# Patient Record
Sex: FEMALE | Race: WHITE | NOT HISPANIC OR LATINO | Employment: OTHER | URBAN - METROPOLITAN AREA
[De-identification: names, ages, dates, MRNs, and addresses within clinical notes are randomized per-mention and may not be internally consistent; named-entity substitution may affect disease eponyms.]

---

## 2017-07-10 ENCOUNTER — TRANSFERRED RECORDS (OUTPATIENT)
Dept: HEALTH INFORMATION MANAGEMENT | Facility: CLINIC | Age: 57
End: 2017-07-10

## 2019-12-20 NOTE — PROGRESS NOTES
Subjective     Carolyn Saldivar is a 59 year old female who presents to clinic today for the following health issues:    HPI   Joint Pain    Onset: 2 months    Description:   Location: left hand 4th digit  Character: Fullness    Intensity: moderate    Progression of Symptoms: same    Accompanying Signs & Symptoms:  Other symptoms: warmth and swelling    History:   Previous similar pain: no       Precipitating factors:   Trauma or overuse: YES- water volleyball    Alleviating factors:  Improved by: immobilization    Therapies Tried and outcome: tapping it to the other finger to immobilize it little relief      -------------------------------------    There is no problem list on file for this patient.    History reviewed. No pertinent surgical history.    Social History     Tobacco Use     Smoking status: Former Smoker     Smokeless tobacco: Never Used   Substance Use Topics     Alcohol use: Yes     Comment: occasionally     Family History   Problem Relation Age of Onset     Heart Failure Mother          Current Outpatient Medications   Medication Sig Dispense Refill     FLUoxetine (PROZAC) 10 MG capsule TAKE 1 CAPSULE BY MOUTH EVERY DAY       fluticasone (FLONASE) 50 MCG/ACT nasal spray INSTILL 1 SPRAY NASALLY ONE TIME A DAY. SHAKE GENTLY BEFORE EACH USE; ALTERNATE NOSTRILS WITH EACH SPRAY.       loratadine (CLARITIN) 10 MG tablet Take 10 mg by mouth daily       losartan (COZAAR) 25 MG tablet TAKE 1 TABLET BY MOUTH EVERY DAY       Allergies   Allergen Reactions     Cats      Dogs      Seasonal Allergies Other (See Comments)     Birch trees, maple trees, aramis grass, orchard grass, mugwart, lamb's quarter, dust mites     BP Readings from Last 3 Encounters:   12/24/19 126/88    Wt Readings from Last 3 Encounters:   12/24/19 81.6 kg (180 lb)                    Reviewed and updated as needed this visit by Provider         Review of Systems   ROS COMP: Constitutional, HEENT, cardiovascular, pulmonary, GI, , musculoskeletal,  "neuro, skin, endocrine and psych systems are negative, except as otherwise noted.      Objective    /88   Pulse 70   Temp 97.6  F (36.4  C) (Temporal)   Resp 14   Ht 1.596 m (5' 2.84\")   Wt 81.6 kg (180 lb)   LMP  (LMP Unknown)   SpO2 95%   BMI 32.05 kg/m    Body mass index is 32.05 kg/m .  Physical Exam  Constitutional:       Appearance: Normal appearance.   HENT:      Head: Normocephalic and atraumatic.   Neck:      Musculoskeletal: Normal range of motion and neck supple.   Cardiovascular:      Rate and Rhythm: Normal rate and regular rhythm.      Pulses: Normal pulses.      Heart sounds: Normal heart sounds. No murmur. No friction rub. No gallop.    Pulmonary:      Effort: Pulmonary effort is normal. No respiratory distress.      Breath sounds: Normal breath sounds.   Chest:      Chest wall: No tenderness.   Musculoskeletal:      Comments: Swollen and mildly tender left ringer finger with limited ROM .    Neurological:      General: No focal deficit present.      Mental Status: She is alert and oriented to person, place, and time.            Diagnostic Test Results:  Labs reviewed in Epic        Assessment & Plan   Problem List Items Addressed This Visit     None      Visit Diagnoses     Injury of finger of left hand, initial encounter    -  Primary    Relevant Orders    XR Finger Left G/E 2 Views (Completed)    OCCUPATIONAL THERAPY REFERRAL         X-ry negative for fracture  Will refer to OT for therapy. Limited ROM likely secondary to prolonged splinting.   Discussed home care  Reportable signs and symptoms discussed  RTC if symptoms persist or fail to improve      BMI:   Estimated body mass index is 32.05 kg/m  as calculated from the following:    Height as of this encounter: 1.596 m (5' 2.84\").    Weight as of this encounter: 81.6 kg (180 lb).           See Patient Instructions  Return in about 3 months (around 3/24/2020) for Physical Exam.    Marcelina Thomas MD  [facility]    "

## 2019-12-24 ENCOUNTER — TELEPHONE (OUTPATIENT)
Dept: FAMILY MEDICINE | Facility: OTHER | Age: 59
End: 2019-12-24

## 2019-12-24 ENCOUNTER — ANCILLARY PROCEDURE (OUTPATIENT)
Dept: GENERAL RADIOLOGY | Facility: OTHER | Age: 59
End: 2019-12-24
Attending: FAMILY MEDICINE
Payer: COMMERCIAL

## 2019-12-24 ENCOUNTER — OFFICE VISIT (OUTPATIENT)
Dept: FAMILY MEDICINE | Facility: OTHER | Age: 59
End: 2019-12-24
Payer: COMMERCIAL

## 2019-12-24 VITALS
WEIGHT: 180 LBS | DIASTOLIC BLOOD PRESSURE: 88 MMHG | RESPIRATION RATE: 14 BRPM | HEIGHT: 63 IN | TEMPERATURE: 97.6 F | OXYGEN SATURATION: 95 % | HEART RATE: 70 BPM | BODY MASS INDEX: 31.89 KG/M2 | SYSTOLIC BLOOD PRESSURE: 126 MMHG

## 2019-12-24 DIAGNOSIS — S69.92XA INJURY OF FINGER OF LEFT HAND, INITIAL ENCOUNTER: ICD-10-CM

## 2019-12-24 DIAGNOSIS — S69.92XA INJURY OF FINGER OF LEFT HAND, INITIAL ENCOUNTER: Primary | ICD-10-CM

## 2019-12-24 PROCEDURE — 99203 OFFICE O/P NEW LOW 30 MIN: CPT | Performed by: FAMILY MEDICINE

## 2019-12-24 PROCEDURE — 73140 X-RAY EXAM OF FINGER(S): CPT | Mod: LT

## 2019-12-24 RX ORDER — FLUTICASONE PROPIONATE 50 MCG
SPRAY, SUSPENSION (ML) NASAL
COMMUNITY
Start: 2017-07-07

## 2019-12-24 RX ORDER — LOSARTAN POTASSIUM 25 MG/1
TABLET ORAL
COMMUNITY
Start: 2018-10-01 | End: 2020-03-31

## 2019-12-24 RX ORDER — FLUOXETINE 10 MG/1
CAPSULE ORAL
COMMUNITY
Start: 2018-07-05 | End: 2020-03-31

## 2019-12-24 RX ORDER — LORATADINE 10 MG/1
10 TABLET ORAL DAILY
COMMUNITY
End: 2020-05-12

## 2019-12-24 ASSESSMENT — MIFFLIN-ST. JEOR: SCORE: 1352.98

## 2019-12-24 NOTE — TELEPHONE ENCOUNTER
----- Message from Marcelina Thomas MD sent at 12/24/2019 10:35 AM CST -----  No apparent fracture noted on X-ray. I placed a order for occupational therapy.

## 2020-03-31 ENCOUNTER — VIRTUAL VISIT (OUTPATIENT)
Dept: FAMILY MEDICINE | Facility: OTHER | Age: 60
End: 2020-03-31
Payer: COMMERCIAL

## 2020-03-31 DIAGNOSIS — F41.1 GENERALIZED ANXIETY DISORDER: ICD-10-CM

## 2020-03-31 DIAGNOSIS — I10 BENIGN ESSENTIAL HYPERTENSION: ICD-10-CM

## 2020-03-31 DIAGNOSIS — F32.1 MODERATE MAJOR DEPRESSION (H): Primary | ICD-10-CM

## 2020-03-31 PROCEDURE — 99214 OFFICE O/P EST MOD 30 MIN: CPT | Mod: TEL | Performed by: PHYSICIAN ASSISTANT

## 2020-03-31 RX ORDER — LOSARTAN POTASSIUM 25 MG/1
25 TABLET ORAL DAILY
Qty: 90 TABLET | Refills: 1 | Status: SHIPPED | OUTPATIENT
Start: 2020-03-31 | End: 2020-08-19

## 2020-03-31 RX ORDER — FLUOXETINE 10 MG/1
CAPSULE ORAL
Qty: 90 CAPSULE | Refills: 1 | Status: SHIPPED | OUTPATIENT
Start: 2020-03-31 | End: 2020-08-19

## 2020-03-31 ASSESSMENT — ANXIETY QUESTIONNAIRES
7. FEELING AFRAID AS IF SOMETHING AWFUL MIGHT HAPPEN: SEVERAL DAYS
6. BECOMING EASILY ANNOYED OR IRRITABLE: MORE THAN HALF THE DAYS
3. WORRYING TOO MUCH ABOUT DIFFERENT THINGS: MORE THAN HALF THE DAYS
2. NOT BEING ABLE TO STOP OR CONTROL WORRYING: MORE THAN HALF THE DAYS
5. BEING SO RESTLESS THAT IT IS HARD TO SIT STILL: NEARLY EVERY DAY
IF YOU CHECKED OFF ANY PROBLEMS ON THIS QUESTIONNAIRE, HOW DIFFICULT HAVE THESE PROBLEMS MADE IT FOR YOU TO DO YOUR WORK, TAKE CARE OF THINGS AT HOME, OR GET ALONG WITH OTHER PEOPLE: SOMEWHAT DIFFICULT
1. FEELING NERVOUS, ANXIOUS, OR ON EDGE: SEVERAL DAYS
GAD7 TOTAL SCORE: 12

## 2020-03-31 ASSESSMENT — PATIENT HEALTH QUESTIONNAIRE - PHQ9
SUM OF ALL RESPONSES TO PHQ QUESTIONS 1-9: 13
5. POOR APPETITE OR OVEREATING: SEVERAL DAYS

## 2020-03-31 NOTE — PROGRESS NOTES
"Subjective     Carolyn Saldivar is a 59 year old female who is being evaluated via a billable telephone visit.      The patient has been notified of following:     \"This telephone visit will be conducted via a call between you and your physician/provider. We have found that certain health care needs can be provided without the need for a physical exam.  This service lets us provide the care you need with a short phone conversation.  If a prescription is necessary we can send it directly to your pharmacy.  If lab work is needed we can place an order for that and you can then stop by our lab to have the test done at a later time.    If during the course of the call the physician/provider feels a telephone visit is not appropriate, you will not be charged for this service.\"     Patient has given verbal consent for Telephone visit?  Yes     Carolyn Saldivar complains of   Chief Complaint   Patient presents with     Recheck Medication     fluoxitine and losartan     She was previously being seen at [facility] in [place] but moved to [place] within the past year so is transferring all of her care to [place]. She is currently down in [place] and she and her  are \"snowbirds\" down there during the winter months. She is requesting a refill of Prozac and losartan. She has a long history of anxiety and depression and states her symptoms have been fairly stable on Prozac for years. She also feels she is in a better mood when she is down in [place]. She has occasional thoughts things would be easier if she were dead but she states she would never harm herself. She is not interested in counseling at this time.     Assessment/Plan:    ICD-10-CM    1. Moderate major depression (H)  F32.1 FLUoxetine (PROZAC) 10 MG capsule   2. Generalized anxiety disorder  F41.1 FLUoxetine (PROZAC) 10 MG capsule   3. Benign essential hypertension  I10 losartan (COZAAR) 25 MG tablet       1-2. Depression and anxiety have overall been " stable. She has no serious thoughts of self harm and states she is actually doing quite well. Will continue current dose of Prozac. She is not interested in counseling at this time but if she changes her mind, she will contact the clinic.     3. Continue current dose of Losartan.    Follow up this summer for annual physical.       Phone call duration: 5 minutes

## 2020-04-01 ASSESSMENT — ANXIETY QUESTIONNAIRES: GAD7 TOTAL SCORE: 12

## 2020-05-12 ENCOUNTER — VIRTUAL VISIT (OUTPATIENT)
Dept: FAMILY MEDICINE | Facility: OTHER | Age: 60
End: 2020-05-12
Payer: COMMERCIAL

## 2020-05-12 DIAGNOSIS — G44.209 TENSION HEADACHE: Primary | ICD-10-CM

## 2020-05-12 PROCEDURE — 99214 OFFICE O/P EST MOD 30 MIN: CPT | Mod: 95 | Performed by: PHYSICIAN ASSISTANT

## 2020-05-12 RX ORDER — PREDNISONE 20 MG/1
40 TABLET ORAL DAILY
Qty: 14 TABLET | Refills: 0 | Status: SHIPPED | OUTPATIENT
Start: 2020-05-12 | End: 2020-07-07

## 2020-05-12 RX ORDER — FEXOFENADINE HCL AND PSEUDOEPHEDRINE HCL 180; 240 MG/1; MG/1
1 TABLET, EXTENDED RELEASE ORAL DAILY
COMMUNITY
End: 2020-06-20

## 2020-05-12 NOTE — PROGRESS NOTES
"Carolyn Saldivar is a 59 year old female who is being evaluated via a billable video visit.      The patient has been notified of following:     \"This video visit will be conducted via a call between you and your physician/provider. We have found that certain health care needs can be provided without the need for an in-person physical exam.  This service lets us provide the care you need with a video conversation.  If a prescription is necessary we can send it directly to your pharmacy.  If lab work is needed we can place an order for that and you can then stop by our lab to have the test done at a later time.    Video visits are billed at different rates depending on your insurance coverage.  Please reach out to your insurance provider with any questions.    If during the course of the call the physician/provider feels a video visit is not appropriate, you will not be charged for this service.\"    Patient has given verbal consent for Video visit? Yes    How would you like to obtain your AVS? E-Mail (inform patient AVS not encrypted)    Patient would like the video invitation sent by: Text to cell phone: [phone]    Will anyone else be joining your video visit? No        Subjective     Carolyn Saldivar is a 59 year old female who presents today via video visit for the following health issues:    HPI    Headache  Onset: Been going on for about 1 month    Description:   Location: \"up the back of my my head\" and tender to the touch   Character: sharp pain  Frequency:  Everyday. Will take 800mg of ibuprofen each time she gets them.   Duration:  Constant. Ibuprofen 800mg takes it away. But when that wears off her headaches come back.     Intensity: severe    Progression of Symptoms:  same    Accompanying Signs & Symptoms:  Stiff neck: YES- when she turns her neck it is sore and she can hear it crack.   Neck or upper back pain: YES- left shoulder pain that comes and goes.  Fever: no  Sinus pressure: sometimes it feels " like she has sinus pressure. She is more congested with her allergies.   Nausea or vomiting: no  Dizziness: no  Numbness: no  Weakness: no  Visual changes: no    History:   Head trauma: no  Family history of migraines: no  Previous tests for headaches: no  Neurologist evaluations: no  Able to do daily activities: YES- as long as she takes her 800mg of ibuprofen.   Wake with a headaches: YES- some days  Do headaches wake you up: no  Daily pain medication use: YES- just the ibuprofen 800mg  Work/school stressors/changes: no     Precipitating factors:   Does light make it worse: no  Does sound make it worse: no    Alleviating factors:  Does sleep help: YES    Video Start Time: 3:57 PM    Patient Active Problem List   Diagnosis     Moderate major depression (H)     Generalized anxiety disorder     Benign essential hypertension     History reviewed. No pertinent surgical history.    Social History     Tobacco Use     Smoking status: Former Smoker     Smokeless tobacco: Never Used   Substance Use Topics     Alcohol use: Yes     Comment: occasionally     Family History   Problem Relation Age of Onset     Heart Failure Mother          Current Outpatient Medications   Medication Sig Dispense Refill     fexofenadine-pseudoePHEDrine (ALLEGRA-D 24) 180-240 MG 24 hr tablet Take 1 tablet by mouth daily       FLUoxetine (PROZAC) 10 MG capsule TAKE 1 CAPSULE BY MOUTH EVERY DAY 90 capsule 1     fluticasone (FLONASE) 50 MCG/ACT nasal spray INSTILL 1 SPRAY NASALLY ONE TIME A DAY. SHAKE GENTLY BEFORE EACH USE; ALTERNATE NOSTRILS WITH EACH SPRAY.       losartan (COZAAR) 25 MG tablet Take 1 tablet (25 mg) by mouth daily 90 tablet 1     predniSONE (DELTASONE) 20 MG tablet Take 2 tablets (40 mg) by mouth daily for 7 days 14 tablet 0     Allergies   Allergen Reactions     Cats      Dogs      Seasonal Allergies Other (See Comments)     Birch trees, maple trees, aramis grass, orchard grass, mugwart, lamb's quarter, dust mites       Reviewed and  "updated as needed this visit by Provider         Review of Systems   Constitutional, HEENT, cardiovascular, pulmonary, GI, , musculoskeletal, neuro, skin, endocrine and psych systems are negative, except as otherwise noted.      Objective    There were no vitals taken for this visit.  Estimated body mass index is 32.05 kg/m  as calculated from the following:    Height as of 12/24/19: 1.596 m (5' 2.84\").    Weight as of 12/24/19: 81.6 kg (180 lb).  Physical Exam     GENERAL: Healthy, alert and no distress  EYES: Eyes grossly normal to inspection.  No discharge or erythema, or obvious scleral/conjunctival abnormalities.  RESP: No audible wheeze, cough, or visible cyanosis.  No visible retractions or increased work of breathing.    SKIN: Visible skin clear. No significant rash, abnormal pigmentation or lesions.  NEURO: Cranial nerves grossly intact.  Mentation and speech appropriate for age.  PSYCH: Mentation appears normal, affect normal/bright, judgement and insight intact, normal speech and appearance well-groomed.      Diagnostic Test Results:  Labs reviewed in Epic  none         Assessment & Plan     1. Tension headache  Symptoms do seem consistent with tension headache likely related to neck strain. Patient reports history of similar in the past prior to needing disc replacement in her cervical spine. She reports she did well tolerate steroids at that time. Will start short course of this as below. Recommended gentle stretching. Close monitoring of symptoms recommended. If not resolving may need to consider further imaging - especially given her history of neck surgery 7 years ago. Discussed red flag symptoms that should prompt immediate care in the ER.   - predniSONE (DELTASONE) 20 MG tablet; Take 2 tablets (40 mg) by mouth daily for 7 days  Dispense: 14 tablet; Refill: 0     The patient indicates understanding of these issues and agrees with the plan.    Melonie Lopez PA-C  [facility]      Video-Visit Details    Type of service:  Video Visit    Video End Time:4:06 PM    Originating Location (pt. Location): Home    Distant Location (provider location):  [facility]     Platform used for Video Visit: Doximity    No follow-ups on file.       Melonie Lopez PA-C

## 2020-06-20 ENCOUNTER — HOSPITAL ENCOUNTER (EMERGENCY)
Facility: CLINIC | Age: 60
Discharge: HOME | End: 2020-06-20
Attending: EMERGENCY MEDICINE | Admitting: EMERGENCY MEDICINE
Payer: COMMERCIAL

## 2020-06-20 ENCOUNTER — NURSE TRIAGE (OUTPATIENT)
Dept: NURSING | Facility: CLINIC | Age: 60
End: 2020-06-20

## 2020-06-20 ENCOUNTER — APPOINTMENT (OUTPATIENT)
Dept: GENERAL RADIOLOGY | Facility: CLINIC | Age: 60
End: 2020-06-20
Attending: EMERGENCY MEDICINE
Payer: COMMERCIAL

## 2020-06-20 VITALS
HEART RATE: 82 BPM | WEIGHT: 183 LBS | BODY MASS INDEX: 32.59 KG/M2 | TEMPERATURE: 98.7 F | RESPIRATION RATE: 20 BRPM | DIASTOLIC BLOOD PRESSURE: 74 MMHG | SYSTOLIC BLOOD PRESSURE: 138 MMHG | OXYGEN SATURATION: 92 %

## 2020-06-20 DIAGNOSIS — J20.9 ACUTE BRONCHITIS WITH BRONCHOSPASM: ICD-10-CM

## 2020-06-20 DIAGNOSIS — Z20.822 COVID-19 RULED OUT: ICD-10-CM

## 2020-06-20 LAB
SARS-COV-2 PCR COMMENT: NORMAL
SARS-COV-2 RNA SPEC QL NAA+PROBE: NEGATIVE
SARS-COV-2 RNA SPEC QL NAA+PROBE: NORMAL
SPECIMEN SOURCE: NORMAL
SPECIMEN SOURCE: NORMAL

## 2020-06-20 PROCEDURE — 99284 EMERGENCY DEPT VISIT MOD MDM: CPT | Mod: Z6 | Performed by: EMERGENCY MEDICINE

## 2020-06-20 PROCEDURE — 71045 X-RAY EXAM CHEST 1 VIEW: CPT | Mod: TC

## 2020-06-20 PROCEDURE — C9803 HOPD COVID-19 SPEC COLLECT: HCPCS | Performed by: EMERGENCY MEDICINE

## 2020-06-20 PROCEDURE — 99284 EMERGENCY DEPT VISIT MOD MDM: CPT | Mod: 25 | Performed by: EMERGENCY MEDICINE

## 2020-06-20 PROCEDURE — 25000132 ZZH RX MED GY IP 250 OP 250 PS 637: Performed by: EMERGENCY MEDICINE

## 2020-06-20 PROCEDURE — 94640 AIRWAY INHALATION TREATMENT: CPT | Performed by: EMERGENCY MEDICINE

## 2020-06-20 PROCEDURE — U0003 INFECTIOUS AGENT DETECTION BY NUCLEIC ACID (DNA OR RNA); SEVERE ACUTE RESPIRATORY SYNDROME CORONAVIRUS 2 (SARS-COV-2) (CORONAVIRUS DISEASE [COVID-19]), AMPLIFIED PROBE TECHNIQUE, MAKING USE OF HIGH THROUGHPUT TECHNOLOGIES AS DESCRIBED BY CMS-2020-01-R: HCPCS | Performed by: EMERGENCY MEDICINE

## 2020-06-20 RX ORDER — ALBUTEROL SULFATE 90 UG/1
4 AEROSOL, METERED RESPIRATORY (INHALATION) ONCE
Status: COMPLETED | OUTPATIENT
Start: 2020-06-20 | End: 2020-06-20

## 2020-06-20 RX ORDER — AZITHROMYCIN 250 MG/1
TABLET, FILM COATED ORAL
Qty: 6 TABLET | Refills: 0 | Status: SHIPPED | OUTPATIENT
Start: 2020-06-20 | End: 2020-07-10

## 2020-06-20 RX ORDER — PREDNISONE 20 MG/1
TABLET ORAL
Qty: 10 TABLET | Refills: 0 | Status: SHIPPED | OUTPATIENT
Start: 2020-06-20 | End: 2020-07-07

## 2020-06-20 RX ORDER — ALBUTEROL SULFATE 90 UG/1
2 AEROSOL, METERED RESPIRATORY (INHALATION) EVERY 4 HOURS PRN
Qty: 1 INHALER | Refills: 0 | Status: SHIPPED | OUTPATIENT
Start: 2020-06-20 | End: 2020-11-05

## 2020-06-20 RX ADMIN — ALBUTEROL SULFATE 4 PUFF: 90 AEROSOL, METERED RESPIRATORY (INHALATION) at 12:40

## 2020-06-20 ASSESSMENT — ENCOUNTER SYMPTOMS
GASTROINTESTINAL NEGATIVE: 1
RHINORRHEA: 1
ACTIVITY CHANGE: 1
FATIGUE: 0
APPETITE CHANGE: 0
PALPITATIONS: 0
FEVER: 0
SINUS PRESSURE: 0
SHORTNESS OF BREATH: 1
COUGH: 1
WHEEZING: 1
MUSCULOSKELETAL NEGATIVE: 1

## 2020-06-20 NOTE — ED PROVIDER NOTES
History     Chief Complaint   Patient presents with     Shortness of Breath     HPI  Carolyn Saldivar is a 59 year old female who presents with cough, congestion, shortness of breath.  Patient reports over the last 3-4 weeks loose cough.  Difficulty bringing phlegm up.  States rattles in her chest.  Has had no associated fever, chills, night sweats or hemoptysis.  20 pack years for tobacco use.  Stopped smoking over 10 years ago.  No unexplained weight loss.  Noted in her medical record that she was given prednisone in early May for similar complaints.  States in the past when she has this type of cough she usually also requires an albuterol inhaler.        Allergies:  Allergies   Allergen Reactions     Cats      Dogs      Seasonal Allergies Other (See Comments)     Birch trees, maple trees, aramis grass, orchard grass, mugwart, lamb's quarter, dust mites       Problem List:    Patient Active Problem List    Diagnosis Date Noted     Moderate major depression (H) 03/31/2020     Priority: Medium     Generalized anxiety disorder 03/31/2020     Priority: Medium     Benign essential hypertension 03/31/2020     Priority: Medium        Past Medical History:    Past Medical History:   Diagnosis Date     Anxiety      Depressive disorder      Hypertension        Past Surgical History:    History reviewed. No pertinent surgical history.    Family History:    Family History   Problem Relation Age of Onset     Heart Failure Mother        Social History:  Marital Status:   [2]  Social History     Tobacco Use     Smoking status: Former Smoker     Smokeless tobacco: Never Used   Substance Use Topics     Alcohol use: Yes     Comment: occasionally     Drug use: Never        Medications:    FLUoxetine (PROZAC) 10 MG capsule  loratadine-pseudoePHEDrine (CLARITIN-D 24-HOUR)  MG 24 hr tablet  losartan (COZAAR) 25 MG tablet  fluticasone (FLONASE) 50 MCG/ACT nasal spray          Review of Systems   Constitutional: Positive for  activity change. Negative for appetite change, fatigue and fever.   HENT: Positive for rhinorrhea. Negative for sinus pressure.    Respiratory: Positive for cough, shortness of breath and wheezing.    Cardiovascular: Negative for chest pain, palpitations and leg swelling.   Gastrointestinal: Negative.    Genitourinary: Negative.    Musculoskeletal: Negative.    All other systems reviewed and are negative.      Physical Exam   BP: (!) 153/100  Pulse: 75  Temp: 98.7  F (37.1  C)  Resp: 20  Weight: 83 kg (183 lb)  SpO2: 94 %      Physical Exam  Vitals signs and nursing note reviewed.   HENT:      Mouth/Throat:      Mouth: Mucous membranes are moist.   Eyes:      Pupils: Pupils are equal, round, and reactive to light.   Neck:      Musculoskeletal: Normal range of motion and neck supple.   Cardiovascular:      Rate and Rhythm: Regular rhythm.   Pulmonary:      Effort: Pulmonary effort is normal. No tachypnea.      Breath sounds: Wheezing and rhonchi present. No rales.   Musculoskeletal:      Right lower leg: No edema.      Left lower leg: No edema.   Skin:     General: Skin is warm.      Capillary Refill: Capillary refill takes less than 2 seconds.   Neurological:      General: No focal deficit present.      Mental Status: She is alert.   Psychiatric:         Mood and Affect: Mood normal.         ED Course        Procedures                   Results for orders placed or performed during the hospital encounter of 06/20/20 (from the past 24 hour(s))   XR Chest Port 1 View    Narrative    CHEST ONE VIEW PORTABLE  6/20/2020 12:40 PM     HISTORY: Shortness of breath.    COMPARISON: None available.      Impression    IMPRESSION: No acute airspace infiltrate.       Medications   albuterol (PROAIR HFA/PROVENTIL HFA/VENTOLIN HFA) 108 (90 Base) MCG/ACT inhaler 4 puff (4 puffs Inhalation Given 6/20/20 1240)       Assessments & Plan (with Medical Decision Making)  ED scoliotic 59-year-old female.  20-pack-year smoking history.   Stopped smoking 10 years ago.  No formal diagnosis of COPD.  Presents with 3-4-week history for loose cough with wheezing and dyspnea.  No fever.  No COVID exposure known.    Examination.  PUI patient, lungs are congested with scattered rhonchi and expiratory wheezing.  Chest x-ray clear  COVID screening pending  Plan: Discharge home.  Prednisone 40 mg daily x5 days, albuterol inhaler 2 puffs every 2 4 hours as needed, Zithromax.       I have reviewed the nursing notes.    I have reviewed the findings, diagnosis, plan and need for follow up with the patient.      New Prescriptions    No medications on file       Final diagnoses:   Acute bronchitis with bronchospasm   COVID-19 ruled out       6/20/2020   [facility] EMERGENCY DEPARTMENT     Darius Curry,   06/20/20 1146

## 2020-06-20 NOTE — DISCHARGE INSTRUCTIONS
Chest x-ray showed no pneumonia.   Results for orders placed or performed during the hospital encounter of 06/20/20   XR Chest Port 1 View     Status: None (Preliminary result)    Narrative    CHEST ONE VIEW PORTABLE  6/20/2020 12:40 PM     HISTORY: Shortness of breath.    COMPARISON: None available.      Impression    IMPRESSION: No acute airspace infiltrate.       TREATMENT:  Prednisone 40 mg daily days  Zithromax/Z-Jens take as directed  Albuterol inhaler 2 puffs every 4 hours as needed  4.  COVID screening test is pending.  He takes up to 2 days for results.

## 2020-06-20 NOTE — ED TRIAGE NOTES
"Patient has been short of breath since yesterday. She reports a cough x3 weeks that started with \"alergies\". History of inhaler use about 15 years ago.  "

## 2020-06-20 NOTE — ED AVS SNAPSHOT
[facility] Emergency Department  [address] [place] [address]  Phone:  [phone]  Fax:  [phone]                                    Carolyn Saldivar   MRN: [id number]    Department:  [facility] Emergency Department   Date of Visit:  6/20/2020           After Visit Summary Signature Page    I have received my discharge instructions, and my questions have been answered. I have discussed any challenges I see with this plan with the nurse or doctor.    ..........................................................................................................................................  Patient/Patient Representative Signature      ..........................................................................................................................................  Patient Representative Print Name and Relationship to Patient    ..................................................               ................................................  Date                                   Time    ..........................................................................................................................................  Reviewed by Signature/Title    ...................................................              ..............................................  Date                                               Time          22EPIC Rev 08/18

## 2020-06-20 NOTE — LETTER
June 21, 2020        Carolyn Saldivar  [id number]  [place] [address]    This letter provides a written record that you were tested for COVID-19 on 6/20/20.       Your result was negative. This means that we didn t find the virus that causes COVID-19 in your sample. A test may show negative when you do actually have the virus. This can happen when the virus is in the early stages of infection, before you feel illness symptoms.    If you have symptoms   Stay home and away from others (self-isolate) until you meet ALL of the guidelines below:    You ve had no fever--and no medicine that reduces fever--for 3 full days (72 hours). And      Your other symptoms have gotten better. For example, your cough or breathing has improved. And     At least 10 days have passed since your symptoms started.    During this time:    Stay home. Don t go to work, school or anywhere else.     Stay in your own room, including for meals. Use your own bathroom if you can.    Stay away from others in your home. No hugging, kissing or shaking hands. No visitors.    Clean  high touch  surfaces often (doorknobs, counters, handles, etc.). Use a household cleaning spray or wipes. You can find a full list on the EPA website at www.epa.gov/pesticide-registration/list-n-disinfectants-use-against-sars-cov-2.    Cover your mouth and nose with a mask, tissue or washcloth to avoid spreading germs.    Wash your hands and face often with soap and water.    Going back to work  Check with your employer for any guidelines to follow for going back to work.    Employers: This document serves as formal notice that your employee tested negative for COVID-19, as of the testing date shown above.

## 2020-06-20 NOTE — TELEPHONE ENCOUNTER
"\"I've had a cough for 3-4 weeks and I'm having trouble breathing\".  Caller is afebrile.  She can be heard wheezing over phone when breathing in.    COVID 19 Nurse Triage Plan/Patient Instructions    Please be aware that novel coronavirus (COVID-19) may be circulating in the community. If you develop symptoms such as fever, cough, or SOB or if you have concerns about the presence of another infection including coronavirus (COVID-19), please contact your health care provider or visit [email or web address].     Disposition/Instructions    Patient to go to ED and follow protocol based instructions.     Bring Your Own Device:  Please also bring your smart device(s) (smart phones, tablets, laptops) and their charging cables for your personal use and to communicate with your care team during your visit.    Thank you for taking steps to prevent the spread of this virus.  o Limit your contact with others.  o Wear a simple mask to cover your cough.  o Wash your hands well and often.    Resources    [facility]: About COVID-19: [email or web address]    CDC: What to Do If You're Sick: www.cdc.gov/coronavirus/2019-ncov/about/steps-when-sick.html    CDC: Ending Home Isolation: www.cdc.gov/coronavirus/2019-ncov/hcp/disposition-in-home-patients.html     CDC: Caring for Someone: www.cdc.gov/coronavirus/2019-ncov/if-you-are-sick/care-for-someone.html     [facility]: Interim Guidance for Hospital Discharge to Home: www.health.[facility].mn.[place]/diseases/coronavirus/hcp/hospdischarge.pdf    [facility] clinical trials (COVID-19 research studies): [email or web address].[facility]-clinical-trials     Below are the COVID-19 hotlines at the [facility] of Health ([facility]). Interpreters are available.   o For health questions: Call 822-320-3929 or 1-372.813.5657 (7 a.m. to 7 p.m.)  o For questions about schools and childcare: Call 273-698-9535 or 1-455.846.2880 (7 a.m. to 7 p.m.)       Reason for Disposition    Difficulty breathing  " (Exception: no change from usual, chronic shortness of breath)    Additional Information    Negative: Severe difficulty breathing (e.g., struggling for each breath, speaks in single words)    Negative: Bluish (or gray) lips or face now    Negative: [1] Rapid onset of cough AND [2] has hives    Negative: Coughing started suddenly after medicine, an allergic food or bee sting    Negative: [1] Difficulty breathing AND [2] exposure to flames, smoke, or fumes    Negative: [1] Stridor AND [2] difficulty breathing    Negative: Sounds like a life-threatening emergency to the triager    Negative: Choked on object of food that could be caught in the throat    Negative: Chest pain is main symptom    Negative: [1] Previous asthma attacks AND [2] this feels like asthma attack    Cough lasts > 3 weeks    Negative: Severe difficulty breathing (e.g., struggling for each breath, speaks in single words)    Negative: [1] Lips or face are bluish now AND [2] persists when not coughing    Negative: Sounds like a life-threatening emergency to the triager    Negative: Chest pain is main symptom    Negative: [1] Dry (non-productive) cough AND [2] < 3 weeks duration     (i.e., no sputum or minimal clear sputum)    Negative: [1] Wet (productive) cough AND [2] < 3 weeks duration     (i.e., white-yellow, yellow, green, or steven colored sputum)    Negative: [1] Previous asthma attacks AND [2] this feels like asthma attack    Negative: Chest pain  (Exception: MILD central chest pain, present only when coughing)    Protocols used: COUGH - CHRONIC-A-AH, COUGH - ACUTE NON-PRODUCTIVE-A-AH    Kathie Leigh RN  [facility]

## 2020-07-02 ENCOUNTER — TELEPHONE (OUTPATIENT)
Dept: FAMILY MEDICINE | Facility: OTHER | Age: 60
End: 2020-07-02

## 2020-07-02 NOTE — TELEPHONE ENCOUNTER
Patient scheduled with me next week for physical. Unfortunately, I am not in clinic next week for F2F. There are other providers she has seen that are in clinic next week or we can schedule with KV is F2F.       SHERRELL Yu CNP  Questions or concerns please feel free to send me a [email or web address] message or call me  Phone : [phone]

## 2020-07-07 PROBLEM — Z87.891 FORMER SMOKER: Status: ACTIVE | Noted: 2020-07-07

## 2020-07-07 NOTE — PROGRESS NOTES
SUBJECTIVE:   CC: Carolyn Saldivar is an 59 year old woman who presents for preventive health visit.     Healthy Habits:    Getting at least 3 servings of Calcium per day:  NO    Bi-annual eye exam:  NO    Dental care twice a year:  NO (once a year)    Sleep apnea or symptoms of sleep apnea:  Sleep apnea    Diet:  Regular (no restrictions)    Frequency of exercise:  2-3 days/week    Duration of exercise:  15-30 minutes    Taking medications regularly:  Yes    Barriers to taking medications:  None    Medication side effects:  Other (sweating a lot)    PHQ-2 Total Score:    Additional concerns today:  Yes (blood test for ovarian cancer, shingles vaccine, pneumonia vaccine, colonoscopy, breast exam/mammo)      Colonoscopy done on this date: 05/18/2010 (approximately), by this group: [facility], results were Colon polyps at 20 cm:  Benign hyperplastic polyps/polyp fragments. .   Mammogram done on this date: 07/2017 (approximately), by this group: [facility], results were Normal, resume annaully.   Pap smear done on this date: 12/9/2015 (approximately), by this group: [facility], results were.   HPV Specimen Source Cervix/Endocx     HPV Type 16 Negative Negative    HPV Type 18 Negative Negative    HPV Other Types Negative   Comment:  The following High Risk HPV genotypes are included in the Other category:  31, 33, 35, 39, 45, 51, 52, 56, 58, 59, 66, and 68    Note: HPV testing from [employer] Pap test preservative has been developed and  its performance characteristics determined by [facility]. This  source has not been cleared or approved by the FDA. Negative        Hepatitis C 2017- Negative.           Today's PHQ-2 Score: No flowsheet data found.    Abuse: Current or Past(Physical, Sexual or Emotional)- No  Do you feel safe in your environment? Yes    Have you ever done Advance Care Planning? (For example, a Health Directive, POLST, or a discussion with a medical provider or your loved ones  "about your wishes): No, advance care planning information given to patient to review.  {:563009}    Social History     Tobacco Use     Smoking status: Former Smoker     Smokeless tobacco: Never Used   Substance Use Topics     Alcohol use: Yes     Comment: occasionally     If you drink alcohol do you typically have >3 drinks per day or >7 drinks per week? No    No flowsheet data found.    Reviewed orders with patient.  Reviewed health maintenance and updated orders accordingly - { :630819::\"Yes\"}  {Chronicprobdata (optional):644058}    {Mammo Decision Support (Optional):297401}    Pertinent mammograms are reviewed under the imaging tab.  History of abnormal Pap smear: { :478099}     Reviewed and updated as needed this visit by clinical staff  Tobacco  Allergies  Meds         Reviewed and updated as needed this visit by Provider        {HISTORY OPTIONS (Optional):769951}    Review of Systems  {FEMALE ROS (Optional):818291}     OBJECTIVE:   LMP  (LMP Unknown)   Physical Exam  {Exam Choices (Optional):095186}    {Diagnostic Test Results (Optional):028506::\"Diagnostic Test Results:\",\"Labs reviewed in Epic\"}    ASSESSMENT/PLAN:   {Diag Picklist:315390}    COUNSELING:  {FEMALE COUNSELING MESSAGES:690559::\"Reviewed preventive health counseling, as reflected in patient instructions\"}    Estimated body mass index is 32.59 kg/m  as calculated from the following:    Height as of 12/24/19: 1.596 m (5' 2.84\").    Weight as of 6/20/20: 83 kg (183 lb).    {Weight Management Plan (ACO) Complete if BMI is abnormal-  Ages 18-64  BMI >24.9.  Age 65+ with BMI <23 or >30 (Optional):271405}     reports that she has quit smoking. She has never used smokeless tobacco.  {Tobacco Cessation -- Complete if patient is a smoker (Optional):214718}    Counseling Resources:  ATP IV Guidelines  Pooled Cohorts Equation Calculator  Breast Cancer Risk Calculator  FRAX Risk Assessment  ICSI Preventive Guidelines  Dietary Guidelines for Americans, " 2010  USDA's MyPlate  ASA Prophylaxis  Lung CA Screening    Ronda Mena, SHERRELL [facility]

## 2020-07-07 NOTE — PATIENT INSTRUCTIONS
Carolyn,     It was nice meeting you today. I apologize we were not able to have your physical.   - Recommend that we start prednisone for your cough along with antibiotics  - Continue to use albuterol as needed  - Covid-19 testing, if this is negative we can do antibody testing to see if you have had Covid-19   - Follow up with scheduled physical.       SHERRELL Yu CNP  Questions or concerns please feel free to send me a [employer] message or call me  Phone : [phone]

## 2020-07-12 ENCOUNTER — TELEPHONE (OUTPATIENT)
Dept: FAMILY MEDICINE | Facility: OTHER | Age: 60
End: 2020-07-12

## 2020-07-13 ENCOUNTER — OFFICE VISIT (OUTPATIENT)
Dept: FAMILY MEDICINE | Facility: OTHER | Age: 60
End: 2020-07-13
Payer: COMMERCIAL

## 2020-07-13 DIAGNOSIS — Z87.891 FORMER SMOKER: ICD-10-CM

## 2020-07-13 DIAGNOSIS — J20.9 ACUTE BRONCHITIS WITH SYMPTOMS > 10 DAYS: Primary | ICD-10-CM

## 2020-07-13 PROCEDURE — 99213 OFFICE O/P EST LOW 20 MIN: CPT | Mod: 95 | Performed by: NURSE PRACTITIONER

## 2020-07-13 RX ORDER — BENZONATATE 100 MG/1
100 CAPSULE ORAL 3 TIMES DAILY PRN
Qty: 30 CAPSULE | Refills: 0 | Status: SHIPPED | OUTPATIENT
Start: 2020-07-13 | End: 2020-08-10

## 2020-07-13 RX ORDER — DOXYCYCLINE 100 MG/1
100 CAPSULE ORAL 2 TIMES DAILY
Qty: 40 CAPSULE | Refills: 0 | Status: SHIPPED | OUTPATIENT
Start: 2020-07-13 | End: 2020-08-06

## 2020-07-13 RX ORDER — PREDNISONE 20 MG/1
TABLET ORAL
Qty: 20 TABLET | Refills: 0 | Status: SHIPPED | OUTPATIENT
Start: 2020-07-13 | End: 2020-08-10

## 2020-07-13 ASSESSMENT — ENCOUNTER SYMPTOMS
CARDIOVASCULAR NEGATIVE: 1
SHORTNESS OF BREATH: 1
COUGH: 1
CONSTITUTIONAL NEGATIVE: 1

## 2020-07-13 NOTE — TELEPHONE ENCOUNTER
Next 5 appointments (look out 90 days)    Jul 13, 2020  1:00 PM CDT  PHYSICAL with SH[facility]LL [facility] CNP  [facility] [address]  [facility] [address] [phone]        Called patient to triage. Seen in ED 6/20/20 for cough/SOB. Diagnosed with acute bronchitis with bronchospasm (23 days ago. Negative for covid-19 at that time. Completed an antibiotic, prednisone, and inhaler for symptoms. Symptoms improved for 4 days, then came back slowly.   Currently has a cough (dry, rarely productive of green sputum)  Exertion makes her cough and a little SOB, using inhaler PRN  Sounds like chest is congested when she speaks  Never has ran a fever with this illness, no fever today  No exposure to a positive case of covid-19 or PUI  No international travel    Explained need to discuss today's f2f visit with  and get approval if we are to keep it as f2f or change to virtual vs. Making another plan for a recheck today. She is ok with postponing any physical items she needed today and only be seen for current symptoms.    Routed to  for recs.  Yasmeen Darby, BSN, RN, PHN

## 2020-07-13 NOTE — TELEPHONE ENCOUNTER
Left message for patient to call back.  Please advise of below and change to video visit if she has that capability.

## 2020-07-13 NOTE — PROGRESS NOTES
"Carolyn Saldivar is a 59 year old female who is being evaluated via a billable video visit.      The patient has been notified of following:     \"This video visit will be conducted via a call between you and your physician/provider. We have found that certain health care needs can be provided without the need for an in-person physical exam.  This service lets us provide the care you need with a video conversation.  If a prescription is necessary we can send it directly to your pharmacy.  If lab work is needed we can place an order for that and you can then stop by our lab to have the test done at a later time.    Video visits are billed at different rates depending on your insurance coverage.  Please reach out to your insurance provider with any questions.    If during the course of the call the physician/provider feels a video visit is not appropriate, you will not be charged for this service.\"    Patient has given verbal consent for Video visit? Yes    How would you like to obtain your AVS? Mail a copy     Patient would like the video invitation sent by: Text to cell phone: [phone]     Will anyone else be joining your video visit? No    Subjective     Carolyn Saldivar is a 59 year old female who presents today via video visit for the following health issues:    HPI     New Patient/Transfer of Care  Acute Illness   Acute illness concerns: Cough   Onset: June - but after being off of antibiotics for a week, it came back    Fever: no    Chills/Sweats: no    Headache (location?): no    Sinus Pressure:YES previously - post-nasal drainage    Conjunctivitis:  no    Ear Pain: no    Rhinorrhea: no    Congestion: no    Sore Throat: no     Cough: YES-productive of brown/green sputum    Wheeze: YES- with shortness of breath    Decreased Appetite: no    Nausea: no    Vomiting: no    Diarrhea:  no    Dysuria/Freq.: no    Fatigue/Achiness: no    Sick/Strep Exposure: no     Therapies Tried and outcome: some antibiotic for 4 " days - albuterol and prednisone - which helped but after being off for a week it came back.    Able to speak full sentences   Taking Albuterol inhaler  Prednisone for 4 days and an antibiotic  Shortness of breath is when she gets up and start walking. She can still function and able to do what she normally does. Does not know of any underlying asthma or COPD. Smoked years ago.   No congestion no headaches no sinus pressure  No nausea or dizziness. Only dizzy if she coughs a ton  No fevers  Hard to tell if she is more fatigued does not feel it is anything out of the normal. Maybe a bit more with heat.   She just coughed up a bunch of phlegm before are call. Been coughing all morning.         Was seen in ER 06/20/20  Assessments & Plan (with Medical Decision Making)  ED scoliotic 59-year-old female.  20-pack-year smoking history.  Stopped smoking 10 years ago.  No formal diagnosis of COPD.  Presents with 3-4-week history for loose cough with wheezing and dyspnea.  No fever.  No COVID exposure known.    Examination.  PUI patient, lungs are congested with scattered rhonchi and expiratory wheezing.  Chest x-ray clear  COVID screening pending  Plan: Discharge home.  Prednisone 40 mg daily x5 days, albuterol inhaler 2 puffs every 2 4 hours as needed, Zithromax.    Video Start Time: 1:22 PM    Current Outpatient Medications   Medication Sig Dispense Refill     albuterol (PROAIR HFA) 108 (90 Base) MCG/ACT inhaler Inhale 2 puffs into the lungs every 4 hours as needed for shortness of breath / dyspnea 1 Inhaler 0     benzonatate (TESSALON) 100 MG capsule Take 1 capsule (100 mg) by mouth 3 times daily as needed for cough 30 capsule 0     doxycycline hyclate (VIBRAMYCIN) 100 MG capsule Take 1 capsule (100 mg) by mouth 2 times daily for 20 days 40 capsule 0     FLUoxetine (PROZAC) 10 MG capsule TAKE 1 CAPSULE BY MOUTH EVERY DAY 90 capsule 1     fluticasone (FLONASE) 50 MCG/ACT nasal spray INSTILL 1 SPRAY NASALLY ONE TIME A DAY.  SHAKE GENTLY BEFORE EACH USE; ALTERNATE NOSTRILS WITH EACH SPRAY.       loratadine-pseudoePHEDrine (CLARITIN-D 24-HOUR)  MG 24 hr tablet Take 1 tablet by mouth daily       losartan (COZAAR) 25 MG tablet Take 1 tablet (25 mg) by mouth daily 90 tablet 1     predniSONE (DELTASONE) 20 MG tablet Take 3 tabs by mouth daily x 3 days, then 2 tabs daily x 3 days, then 1 tab daily x 3 days, then 1/2 tab daily x 3 days. 20 tablet 0     BP Readings from Last 3 Encounters:   06/20/20 138/74   12/24/19 126/88    Wt Readings from Last 3 Encounters:   06/20/20 83 kg (183 lb)   12/24/19 81.6 kg (180 lb)                    Reviewed and updated as needed this visit by Provider  Allergies         Review of Systems   Constitutional: Negative.    HENT: Negative.    Respiratory: Positive for cough and shortness of breath.    Cardiovascular: Negative.             Objective             Physical Exam     GENERAL: Healthy, alert and no distress  EYES: Eyes grossly normal to inspection.  No discharge or erythema, or obvious scleral/conjunctival abnormalities.  RESP: no wheezes and barking cough present. Able to speak full sentences.   SKIN: Visible skin clear. No significant rash, abnormal pigmentation or lesions.  NEURO: Cranial nerves grossly intact.  Mentation and speech appropriate for age.  PSYCH: Mentation appears normal, affect normal/bright, judgement and insight intact, normal speech and appearance well-groomed.      Diagnostic Test Results:  Covid -19 ordered         Assessment & Plan       ICD-10-CM    1. Acute bronchitis with symptoms > 10 days  J20.9 predniSONE (DELTASONE) 20 MG tablet     doxycycline hyclate (VIBRAMYCIN) 100 MG capsule     benzonatate (TESSALON) 100 MG capsule     Symptomatic COVID-19 Virus (Coronavirus) by PCR   2. Former smoker  Z87.891      Given patients history suspect that this is likely reactive airway. Given length of time she has had symptoms I do recommend that we do a longer course of steroids  along with doxycycline. No fevers or chills and she is able to speak full sentences so I do not feel that we need to do xrays or physical assessment at this time. She appears stable. Given previously negative covid-19, if this is negative recommend serology testing. Tessalon pearls to help with cough. Recommend self-isolate until Covid-19 is back. Follow up to do physical 4 weeks, may want to consider pulmonology lung function testing given former smoker.       The patient indicates understanding of these issues and agrees with the plan.    See Patient Instructions  Patient Instructions   Carolyn,     It was nice meeting you today.   - Schedule Mammogram when able, this can be done by calling [phone].   - You will be getting a number to schedule your colonoscopy.   - I will contact you with your results.   - Continue current medications  - Follow up as needed.       SHERRELL Yu CNP  Questions or concerns please feel free to send me a [employer] message or call me  Phone : [phone]                Return in about 4 weeks (around 8/10/2020) for Physical Exam.    SHERRELL Yu CNP  [facility]      Video-Visit Details    Type of service:  Video Visit    Video End Time:1:38 PM    Originating Location (pt. Location): Home    Distant Location (provider location):  [facility]     Platform used for Video Visit: Nirav    Return in about 4 weeks (around 8/10/2020) for Physical Exam.       SHERRELL Yu CNP

## 2020-07-13 NOTE — TELEPHONE ENCOUNTER
RN's please call patient in the AM. Triage SOB and Cough. Can I do virtual with PUI assessment or does she need higher level of care. Huddle with KV after triage.     Please reschedule physical for later date.     SHERRELL Yu CNP  Questions or concerns please feel free to send me a [employer] message or call me  Phone : [phone]

## 2020-07-13 NOTE — TELEPHONE ENCOUNTER
Please schedule video visit. I will possibly have her come in for PUI xray and labs in addition to some vitals.       SHERRELL Yu CNP  Questions or concerns please feel free to send me a [employer] message or call me  Phone : [phone]

## 2020-07-13 NOTE — TELEPHONE ENCOUNTER
Please mail AVS from todays virtual visit. Give patient information to start [employer] as well.       SHERRELL Yu CNP  Questions or concerns please feel free to send me a [employer] message or call me  Phone : [phone]

## 2020-07-28 ENCOUNTER — TELEPHONE (OUTPATIENT)
Dept: FAMILY MEDICINE | Facility: OTHER | Age: 60
End: 2020-07-28

## 2020-07-28 DIAGNOSIS — Z12.31 ENCOUNTER FOR SCREENING MAMMOGRAM FOR BREAST CANCER: ICD-10-CM

## 2020-07-28 DIAGNOSIS — G47.33 OSA ON CPAP: ICD-10-CM

## 2020-07-28 DIAGNOSIS — E66.811 OBESITY (BMI 30.0-34.9): ICD-10-CM

## 2020-07-28 DIAGNOSIS — Z12.11 SPECIAL SCREENING FOR MALIGNANT NEOPLASMS, COLON: ICD-10-CM

## 2020-07-28 DIAGNOSIS — F41.1 GENERALIZED ANXIETY DISORDER: ICD-10-CM

## 2020-07-28 DIAGNOSIS — Z87.891 FORMER SMOKER: ICD-10-CM

## 2020-07-28 DIAGNOSIS — F32.1 MODERATE MAJOR DEPRESSION (H): ICD-10-CM

## 2020-07-28 DIAGNOSIS — I10 BENIGN ESSENTIAL HYPERTENSION: Primary | ICD-10-CM

## 2020-07-28 DIAGNOSIS — E78.1 HYPERTRIGLYCERIDEMIA: ICD-10-CM

## 2020-07-29 NOTE — PROGRESS NOTES
"   SUBJECTIVE:   CC: Carolyn Saldivar is an 59 year old woman who presents for preventive health visit.     Healthy Habits:     Getting at least 3 servings of Calcium per day:  NO    Bi-annual eye exam:  NO    Dental care twice a year:  NO    Sleep apnea or symptoms of sleep apnea:  Sleep apnea    Diet:  Regular (no restrictions)    Frequency of exercise:  2-3 days/week    Duration of exercise:  30-45 minutes    Taking medications regularly:  Yes    Barriers to taking medications:  None    Medication side effects:  Other (1) \"i sweats \" pt is unsure if this side effects)    PHQ-2 Total Score: 1    Additional concerns today:  No      Colonoscopy done on this date: 05/18/10 (approximately), by this group:[facility] , results were Benign hyperplastic polyps/polyp fragments   Mammogram done on this date: 07/12/17 (approximately), by this group: [facility], results were Normal.   Pap smear done on this date:12/09/15  (approximately), by this group: [facility] , results were Normal, Negative HPV.   Hep C negative.      is alcoholic  Son addicted to Meth  So lots of situational stress.  Prozac daily, been on that for years.     Novashare ablation. Menopaus 5-6 years ago.         Today's PHQ-2 Score:   PHQ-2 ( 1999 Pfizer) 7/10/2020   Q1: Little interest or pleasure in doing things 0   Q2: Feeling down, depressed or hopeless 0   PHQ-2 Score 0     PHQ 3/31/2020 8/6/2020   PHQ-9 Total Score 13 8   Q9: Thoughts of better off dead/self-harm past 2 weeks Several days Several days     BRENDA-7 SCORE 3/31/2020 8/6/2020   Total Score 12 10           Abuse: Current or Past(Physical, Sexual or Emotional)- No  Do you feel safe in your environment? Yes    Have you ever done Advance Care Planning? (For example, a Health Directive, POLST, or a discussion with a medical provider or your loved ones about your wishes): No, advance care planning information given to patient to review.  Advanced care planning was discussed at " today's visit.    Social History     Tobacco Use     Smoking status: Former Smoker     Packs/day: 0.50     Years: 15.00     Pack years: 7.50     Types: Cigarettes     Start date:      Last attempt to quit:      Years since quittin.6     Smokeless tobacco: Never Used   Substance Use Topics     Alcohol use: Yes     Comment: occasionally     If you drink alcohol do you typically have >3 drinks per day or >7 drinks per week? No    Alcohol Use 8/10/2020   Prescreen: >3 drinks/day or >7 drinks/week? No       Reviewed orders with patient.  Reviewed health maintenance and updated orders accordingly - Yes  Lab work is in process  BP Readings from Last 3 Encounters:   08/10/20 122/74   20 138/74   19 126/88    Wt Readings from Last 3 Encounters:   08/10/20 81.9 kg (180 lb 9.6 oz)   20 83 kg (183 lb)   19 81.6 kg (180 lb)                  Patient Active Problem List   Diagnosis     Moderate major depression (H)     Generalized anxiety disorder     Benign essential hypertension     Allergic rhinitis due to pollen     Carpal tunnel syndrome     Diverticulosis of colon     Eczema     Family history of colonic polyps     Heartburn     Hypertriglyceridemia     Obesity (BMI 30.0-34.9)     ROSA on CPAP     Routine general medical examination at a health care facility     Situational depression     Status post shoulder surgery     Former smoker     Past Surgical History:   Procedure Laterality Date     C CERV SPINE FUSN,ANTER,BELOW C2  2008    [facility]     COLONOSCOPY  2010       Social History     Tobacco Use     Smoking status: Former Smoker     Packs/day: 0.50     Years: 15.00     Pack years: 7.50     Types: Cigarettes     Start date:      Last attempt to quit:      Years since quittin.6     Smokeless tobacco: Never Used   Substance Use Topics     Alcohol use: Yes     Comment: occasionally     Family History   Problem Relation Age of Onset     Heart Failure Mother   "        Current Outpatient Medications   Medication Sig Dispense Refill     albuterol (PROAIR HFA) 108 (90 Base) MCG/ACT inhaler Inhale 2 puffs into the lungs every 4 hours as needed for shortness of breath / dyspnea 1 Inhaler 0     FLUoxetine (PROZAC) 10 MG capsule TAKE 1 CAPSULE BY MOUTH EVERY DAY 90 capsule 1     fluticasone (FLONASE) 50 MCG/ACT nasal spray INSTILL 1 SPRAY NASALLY ONE TIME A DAY. SHAKE GENTLY BEFORE EACH USE; ALTERNATE NOSTRILS WITH EACH SPRAY.       loratadine-pseudoePHEDrine (CLARITIN-D 24-HOUR)  MG 24 hr tablet Take 1 tablet by mouth daily       losartan (COZAAR) 25 MG tablet Take 1 tablet (25 mg) by mouth daily 90 tablet 1       Mammogram Screening: Patient over age 50, mutual decision to screen reflected in health maintenance.    Pertinent mammograms are reviewed under the imaging tab.  History of abnormal Pap smear: NO - age 30-65 PAP every 5 years with negative HPV co-testing recommended     Reviewed and updated as needed this visit by clinical staff  Tobacco  Allergies  Meds  Med Hx  Surg Hx  Fam Hx  Soc Hx        Reviewed and updated as needed this visit by Provider  Meds        Past Medical History:   Diagnosis Date     Anxiety      Depressive disorder      Hypertension       Past Surgical History:   Procedure Laterality Date     C CERV SPINE FUSN,ANTER,BELOW C2  06/08/2008    [facility]     COLONOSCOPY  05/18/2010       Review of Systems       OBJECTIVE:   /74   Pulse 72   Temp 98.1  F (36.7  C)   Resp 14   Ht 1.6 m (5' 3\")   Wt 81.9 kg (180 lb 9.6 oz)   LMP  (LMP Unknown)   BMI 31.99 kg/m    Physical Exam  Constitutional:       Appearance: She is well-developed.   HENT:      Right Ear: Tympanic membrane, ear canal and external ear normal.      Left Ear: Tympanic membrane, ear canal and external ear normal.      Nose: Nose normal.      Mouth/Throat:      Mouth: Mucous membranes are dry.   Eyes:      Conjunctiva/sclera: Conjunctivae normal.      Pupils: " Pupils are equal, round, and reactive to light.      Comments: Bilateral eyelids with skin tags.      Neck:      Musculoskeletal: Normal range of motion and neck supple.      Thyroid: No thyroid mass, thyromegaly or thyroid tenderness.   Cardiovascular:      Rate and Rhythm: Normal rate and regular rhythm.      Heart sounds: Normal heart sounds.   Pulmonary:      Effort: Pulmonary effort is normal.      Breath sounds: Normal breath sounds.   Chest:      Chest wall: No mass.      Breasts:         Right: No swelling, bleeding, inverted nipple, mass, nipple discharge, skin change or tenderness.         Left: Normal. No swelling, bleeding, inverted nipple, mass, nipple discharge, skin change or tenderness.   Abdominal:      General: Bowel sounds are normal.      Palpations: Abdomen is soft.   Genitourinary:     Cervix: No cervical motion tenderness, discharge, friability, erythema or cervical bleeding.   Musculoskeletal: Normal range of motion.   Lymphadenopathy:      Cervical: No cervical adenopathy.      Right cervical: No superficial, deep or posterior cervical adenopathy.     Left cervical: No superficial, deep or posterior cervical adenopathy.      Upper Body:      Right upper body: No supraclavicular, axillary or pectoral adenopathy.      Left upper body: No supraclavicular, axillary or pectoral adenopathy.   Skin:     General: Skin is warm and dry.   Neurological:      Mental Status: She is alert and oriented to person, place, and time.           Diagnostic Test Results:  Labs reviewed in Epic    ASSESSMENT/PLAN:   1. Routine general medical examination at a health care facility  - Updated HM as this is a new patient to me today.       2. Moderate major depression (H)  - Stable on Prozac  - Having some increase in her symptoms denies any self harm. Stress with family.   - Labs today, ok to refill as needed.   - Follow up with me in 6 months.   - Comprehensive metabolic panel (BMP + Alb, Alk Phos, ALT, AST, Total.  "Bili, TP)    3. Breast cancer screening  - MA scheduled.     4. Screen for colon cancer  - Colonoscopy Scheduled     5. Screening for HIV (human immunodeficiency virus)    - HIV Screening; Future    6. Screening for malignant neoplasm of cervix  - Had questions about  currently not recommended for screening.   - Pap without pain or discomfort  - Pap imaged thin layer screen with HPV - recommended age 30 - 65 years (select HPV order below)  - HPV High Risk Types DNA Cervical    7. Benign essential hypertension  - Stable on Losartan  - Ok for refills as needed, follow up in 6 months.   - Comprehensive metabolic panel (BMP + Alb, Alk Phos, ALT, AST, Total. Bili, TP)    8. Generalized anxiety disorder  - As noted above  - Comprehensive metabolic panel (BMP + Alb, Alk Phos, ALT, AST, Total. Bili, TP)    9. Hypertriglyceridemia  - Fasting today.   - Lipid panel reflex to direct LDL Fasting    10. Obesity (BMI 30.0-34.9)      11. ROSA on CPAP  - Uses CPAP    12. Former smoker      13. Skin lesion  - Referral placed   - DERMATOLOGY REFERRAL    14. Sweating increase  - Increase in sweating with heat  - Family history of thyroid disorder- mother and sister.   - TSH with free T4 reflex  - Estradiol  - Follicle stimulating hormone    COUNSELING:  Reviewed preventive health counseling, as reflected in patient instructions       Regular exercise       Healthy diet/nutrition       Immunizations  Follow up on Shingles vaccine will let me know if she wants it here or pharmacy.          Osteoporosis Prevention/Bone Health       Colon cancer screening    Estimated body mass index is 31.99 kg/m  as calculated from the following:    Height as of this encounter: 1.6 m (5' 3\").    Weight as of this encounter: 81.9 kg (180 lb 9.6 oz).    Weight management plan: Discussed healthy diet and exercise guidelines     reports that she quit smoking about 30 years ago. Her smoking use included cigarettes. She started smoking about 50 years " ago. She has a 7.50 pack-year smoking history. She has never used smokeless tobacco.      Counseling Resources:  ATP IV Guidelines  Pooled Cohorts Equation Calculator  Breast Cancer Risk Calculator  FRAX Risk Assessment  ICSI Preventive Guidelines  Dietary Guidelines for Americans, 2010  USDA's MyPlate  ASA Prophylaxis  Lung CA Screening    SHERRELL Yu [facility]

## 2020-07-29 NOTE — TELEPHONE ENCOUNTER
Patient has physical with me, please remind to fast for labs or come in an alternative day to have this done. Is see she has an AM appointment, recommend fasting at least 8 hours prior to labs. Labs placed. Hope she is feeling better with her cough. I do not see she had the covid-19 testing, I assume she is doing better.     I also know we talked about her colonoscopy and mammogram being due. I put in orders for this for her as well. We can discuss this more at her visit if she has questions.     SHERRELL Yu CNP  Questions or concerns please feel free to send me a [facility] [employer] message or call me  Phone : [phone]

## 2020-08-04 NOTE — PATIENT INSTRUCTIONS
Carolyn,     Maureen to see you today. Recommend continuing with your preventative care as we have discussed. In addition I do recommend you consider a Shingles Vaccines.   Check at the pharmacy for coverage of the new shingles vaccine, Shingrix. If it is not covered now, it may be covered later in the year. Shingrix is given in a 2 shot series, between 2 and 6 months apart. It is recommended for adults age 50 and older. Initial studies have indicated that this new vaccine is 85-90% effective at preventing shingles, and is preferred over the old Zostavax vaccine.         Preventive Health Recommendations  Female Ages 50 - 64    Yearly exam: See your health care provider every year in order to  o Review health changes.   o Discuss preventive care.    o Review your medicines if your doctor has prescribed any.      Get a Pap test every three years (unless you have an abnormal result and your provider advises testing more often).    If you get Pap tests with HPV test, you only need to test every 5 years, unless you have an abnormal result.     You do not need a Pap test if your uterus was removed (hysterectomy) and you have not had cancer.    You should be tested each year for STDs (sexually transmitted diseases) if you're at risk.     Have a mammogram every 1 to 2 years.    Have a colonoscopy at age 50, or have a yearly FIT test (stool test). These exams screen for colon cancer.      Have a cholesterol test every 5 years, or more often if advised.    Have a diabetes test (fasting glucose) every three years. If you are at risk for diabetes, you should have this test more often.     If you are at risk for osteoporosis (brittle bone disease), think about having a bone density scan (DEXA).    Shots: Get a flu shot each year. Get a tetanus shot every 10 years.    Nutrition:     Eat at least 5 servings of fruits and vegetables each day.    Eat whole-grain bread, whole-wheat pasta and brown rice instead of white grains and  rice.    Get adequate Calcium and Vitamin D.     Lifestyle    Exercise at least 150 minutes a week (30 minutes a day, 5 days a week). This will help you control your weight and prevent disease.    Limit alcohol to one drink per day.    No smoking.     Wear sunscreen to prevent skin cancer.     See your dentist every six months for an exam and cleaning.    See your eye doctor every 1 to 2 years.

## 2020-08-05 ENCOUNTER — HOSPITAL ENCOUNTER (OUTPATIENT)
Facility: CLINIC | Age: 60
End: 2020-08-05
Attending: SURGERY | Admitting: SURGERY
Payer: COMMERCIAL

## 2020-08-05 ENCOUNTER — TELEPHONE (OUTPATIENT)
Dept: FAMILY MEDICINE | Facility: OTHER | Age: 60
End: 2020-08-05

## 2020-08-05 DIAGNOSIS — Z11.59 ENCOUNTER FOR SCREENING FOR OTHER VIRAL DISEASES: Primary | ICD-10-CM

## 2020-08-05 NOTE — LETTER

## 2020-08-05 NOTE — TELEPHONE ENCOUNTER
Left message for patient to return call to schedule EGD/colonoscopy. If Karla or Ronda are not available, please transfer to same day surgery

## 2020-08-05 NOTE — TELEPHONE ENCOUNTER
Date of colonoscopy/EGD: 9/10/20  Surgeon: Dr. Green  Prep:Miralax  Packet:Colonoscopy/EGD instructions mailed to patient's home address.   Date: 8/5/2020      Surgery Scheduler

## 2020-08-05 NOTE — LETTER
[facility]  [address]  [place] [address]  [phone]        August 5, 2020    Carolyn Saldivar  [address] [place] [address]   [facility] [address]

## 2020-08-06 ASSESSMENT — ANXIETY QUESTIONNAIRES
1. FEELING NERVOUS, ANXIOUS, OR ON EDGE: SEVERAL DAYS
IF YOU CHECKED OFF ANY PROBLEMS ON THIS QUESTIONNAIRE, HOW DIFFICULT HAVE THESE PROBLEMS MADE IT FOR YOU TO DO YOUR WORK, TAKE CARE OF THINGS AT HOME, OR GET ALONG WITH OTHER PEOPLE: SOMEWHAT DIFFICULT
7. FEELING AFRAID AS IF SOMETHING AWFUL MIGHT HAPPEN: NOT AT ALL
5. BEING SO RESTLESS THAT IT IS HARD TO SIT STILL: NEARLY EVERY DAY
GAD7 TOTAL SCORE: 10
2. NOT BEING ABLE TO STOP OR CONTROL WORRYING: SEVERAL DAYS
6. BECOMING EASILY ANNOYED OR IRRITABLE: MORE THAN HALF THE DAYS
3. WORRYING TOO MUCH ABOUT DIFFERENT THINGS: SEVERAL DAYS

## 2020-08-06 ASSESSMENT — PATIENT HEALTH QUESTIONNAIRE - PHQ9: 5. POOR APPETITE OR OVEREATING: MORE THAN HALF THE DAYS

## 2020-08-07 ASSESSMENT — ANXIETY QUESTIONNAIRES: GAD7 TOTAL SCORE: 10

## 2020-08-10 ENCOUNTER — OFFICE VISIT (OUTPATIENT)
Dept: FAMILY MEDICINE | Facility: OTHER | Age: 60
End: 2020-08-10
Payer: COMMERCIAL

## 2020-08-10 VITALS
DIASTOLIC BLOOD PRESSURE: 74 MMHG | SYSTOLIC BLOOD PRESSURE: 122 MMHG | HEIGHT: 63 IN | TEMPERATURE: 98.1 F | RESPIRATION RATE: 14 BRPM | BODY MASS INDEX: 32 KG/M2 | WEIGHT: 180.6 LBS | HEART RATE: 72 BPM

## 2020-08-10 DIAGNOSIS — F32.1 MODERATE MAJOR DEPRESSION (H): ICD-10-CM

## 2020-08-10 DIAGNOSIS — Z87.891 FORMER SMOKER: ICD-10-CM

## 2020-08-10 DIAGNOSIS — I10 BENIGN ESSENTIAL HYPERTENSION: ICD-10-CM

## 2020-08-10 DIAGNOSIS — E66.811 OBESITY (BMI 30.0-34.9): ICD-10-CM

## 2020-08-10 DIAGNOSIS — R61 SWEATING INCREASE: ICD-10-CM

## 2020-08-10 DIAGNOSIS — F41.1 GENERALIZED ANXIETY DISORDER: ICD-10-CM

## 2020-08-10 DIAGNOSIS — Z12.11 SCREEN FOR COLON CANCER: ICD-10-CM

## 2020-08-10 DIAGNOSIS — Z11.4 SCREENING FOR HIV (HUMAN IMMUNODEFICIENCY VIRUS): ICD-10-CM

## 2020-08-10 DIAGNOSIS — L98.9 SKIN LESION: ICD-10-CM

## 2020-08-10 DIAGNOSIS — Z00.00 ROUTINE GENERAL MEDICAL EXAMINATION AT A HEALTH CARE FACILITY: Primary | ICD-10-CM

## 2020-08-10 DIAGNOSIS — Z12.4 SCREENING FOR MALIGNANT NEOPLASM OF CERVIX: ICD-10-CM

## 2020-08-10 DIAGNOSIS — G47.33 OSA ON CPAP: ICD-10-CM

## 2020-08-10 DIAGNOSIS — E78.1 HYPERTRIGLYCERIDEMIA: ICD-10-CM

## 2020-08-10 DIAGNOSIS — Z12.39 BREAST CANCER SCREENING: ICD-10-CM

## 2020-08-10 LAB
ALBUMIN SERPL-MCNC: 3.8 G/DL (ref 3.4–5)
ALP SERPL-CCNC: 72 U/L (ref 40–150)
ALT SERPL W P-5'-P-CCNC: 22 U/L (ref 0–50)
ANION GAP SERPL CALCULATED.3IONS-SCNC: 6 MMOL/L (ref 3–14)
AST SERPL W P-5'-P-CCNC: 14 U/L (ref 0–45)
BILIRUB SERPL-MCNC: 0.3 MG/DL (ref 0.2–1.3)
BUN SERPL-MCNC: 12 MG/DL (ref 7–30)
CALCIUM SERPL-MCNC: 9.2 MG/DL (ref 8.5–10.1)
CHLORIDE SERPL-SCNC: 106 MMOL/L (ref 94–109)
CHOLEST SERPL-MCNC: 247 MG/DL
CO2 SERPL-SCNC: 28 MMOL/L (ref 20–32)
CREAT SERPL-MCNC: 0.75 MG/DL (ref 0.52–1.04)
ESTRADIOL SERPL-MCNC: 20 PG/ML
FSH SERPL-ACNC: 43.1 IU/L
GFR SERPL CREATININE-BSD FRML MDRD: 85 ML/MIN/{1.73_M2}
GLUCOSE SERPL-MCNC: 95 MG/DL (ref 70–99)
HDLC SERPL-MCNC: 42 MG/DL
LDLC SERPL CALC-MCNC: ABNORMAL MG/DL
LDLC SERPL DIRECT ASSAY-MCNC: 147 MG/DL
NONHDLC SERPL-MCNC: 205 MG/DL
POTASSIUM SERPL-SCNC: 4.7 MMOL/L (ref 3.4–5.3)
PROT SERPL-MCNC: 7.7 G/DL (ref 6.8–8.8)
SODIUM SERPL-SCNC: 140 MMOL/L (ref 133–144)
TRIGL SERPL-MCNC: 509 MG/DL
TSH SERPL DL<=0.005 MIU/L-ACNC: 2.51 MU/L (ref 0.4–4)

## 2020-08-10 PROCEDURE — 82670 ASSAY OF TOTAL ESTRADIOL: CPT | Performed by: NURSE PRACTITIONER

## 2020-08-10 PROCEDURE — 80053 COMPREHEN METABOLIC PANEL: CPT | Performed by: NURSE PRACTITIONER

## 2020-08-10 PROCEDURE — 80061 LIPID PANEL: CPT | Performed by: NURSE PRACTITIONER

## 2020-08-10 PROCEDURE — 84443 ASSAY THYROID STIM HORMONE: CPT | Performed by: NURSE PRACTITIONER

## 2020-08-10 PROCEDURE — 87624 HPV HI-RISK TYP POOLED RSLT: CPT | Performed by: NURSE PRACTITIONER

## 2020-08-10 PROCEDURE — 99214 OFFICE O/P EST MOD 30 MIN: CPT | Mod: 25 | Performed by: NURSE PRACTITIONER

## 2020-08-10 PROCEDURE — 83001 ASSAY OF GONADOTROPIN (FSH): CPT | Performed by: NURSE PRACTITIONER

## 2020-08-10 PROCEDURE — G0145 SCR C/V CYTO,THINLAYER,RESCR: HCPCS | Performed by: NURSE PRACTITIONER

## 2020-08-10 PROCEDURE — 36415 COLL VENOUS BLD VENIPUNCTURE: CPT | Performed by: NURSE PRACTITIONER

## 2020-08-10 PROCEDURE — 83721 ASSAY OF BLOOD LIPOPROTEIN: CPT | Mod: 59 | Performed by: NURSE PRACTITIONER

## 2020-08-10 PROCEDURE — 99396 PREV VISIT EST AGE 40-64: CPT | Performed by: NURSE PRACTITIONER

## 2020-08-10 ASSESSMENT — PAIN SCALES - GENERAL: PAINLEVEL: NO PAIN (0)

## 2020-08-10 ASSESSMENT — MIFFLIN-ST. JEOR: SCORE: 1358.33

## 2020-08-10 ASSESSMENT — ANXIETY QUESTIONNAIRES
2. NOT BEING ABLE TO STOP OR CONTROL WORRYING: NEARLY EVERY DAY
6. BECOMING EASILY ANNOYED OR IRRITABLE: SEVERAL DAYS
GAD7 TOTAL SCORE: 10
5. BEING SO RESTLESS THAT IT IS HARD TO SIT STILL: NEARLY EVERY DAY
3. WORRYING TOO MUCH ABOUT DIFFERENT THINGS: MORE THAN HALF THE DAYS
7. FEELING AFRAID AS IF SOMETHING AWFUL MIGHT HAPPEN: NOT AT ALL
IF YOU CHECKED OFF ANY PROBLEMS ON THIS QUESTIONNAIRE, HOW DIFFICULT HAVE THESE PROBLEMS MADE IT FOR YOU TO DO YOUR WORK, TAKE CARE OF THINGS AT HOME, OR GET ALONG WITH OTHER PEOPLE: SOMEWHAT DIFFICULT
1. FEELING NERVOUS, ANXIOUS, OR ON EDGE: SEVERAL DAYS

## 2020-08-10 ASSESSMENT — PATIENT HEALTH QUESTIONNAIRE - PHQ9
SUM OF ALL RESPONSES TO PHQ QUESTIONS 1-9: 7
5. POOR APPETITE OR OVEREATING: NOT AT ALL

## 2020-08-10 NOTE — LETTER
August 14, 2020    Carolyn Saldivar  [address]   [facility] [address]    Dear ,  This letter is regarding your recent Pap smear (cervical cancer screening) and Human Papillomavirus (HPV) test.  We are happy to inform you that your Pap smear result is normal. Cervical cancer is closely linked with certain types of HPV. Your results showed no evidence of high-risk HPV.  We recommend you have your next PAP smear and HPV test in 5 years.  You will still need to return to the clinic every year for an annual exam and other preventive tests.  If you have additional questions regarding this result, please call our registered nurse, Krysten at [phone].  Sincerely,    SHERRELL Yu CNP //[facility]

## 2020-08-12 LAB
COPATH REPORT: NORMAL
PAP: NORMAL

## 2020-08-13 LAB
FINAL DIAGNOSIS: NORMAL
HPV HR 12 DNA CVX QL NAA+PROBE: NEGATIVE
HPV16 DNA SPEC QL NAA+PROBE: NEGATIVE
HPV18 DNA SPEC QL NAA+PROBE: NEGATIVE
SPECIMEN DESCRIPTION: NORMAL
SPECIMEN SOURCE CVX/VAG CYTO: NORMAL

## 2020-08-14 NOTE — PROGRESS NOTES
"Subjective     Carolyn Saldivar is a 59 year old female who presents to clinic today for the following health issues:    HPI     LAB RESULTS  would like to go over results and plan of care           {SUPERLIST (Optional):671050}  {additonal problems for provider to add (Optional):283403}    {HIST REVIEW/ LINKS 2 (Optional):085420}    Reviewed and updated as needed this visit by Provider         Review of Systems   {ROS COMP (Optional):509895}      Objective    LMP  (LMP Unknown)   There is no height or weight on file to calculate BMI.  Physical Exam   {Exam List (Optional):802757}    {Diagnostic Test Results (Optional):672640::\"Diagnostic Test Results:\",\"Labs reviewed in Epic\"}        {PROVIDER CHARTING PREFERENCE:866420}  "

## 2020-08-19 ENCOUNTER — TELEPHONE (OUTPATIENT)
Dept: FAMILY MEDICINE | Facility: OTHER | Age: 60
End: 2020-08-19

## 2020-08-19 ENCOUNTER — VIRTUAL VISIT (OUTPATIENT)
Dept: FAMILY MEDICINE | Facility: OTHER | Age: 60
End: 2020-08-19
Payer: COMMERCIAL

## 2020-08-19 DIAGNOSIS — E66.811 OBESITY (BMI 30.0-34.9): ICD-10-CM

## 2020-08-19 DIAGNOSIS — I10 BENIGN ESSENTIAL HYPERTENSION: Primary | ICD-10-CM

## 2020-08-19 DIAGNOSIS — F41.1 GENERALIZED ANXIETY DISORDER: ICD-10-CM

## 2020-08-19 DIAGNOSIS — F32.1 MODERATE MAJOR DEPRESSION (H): ICD-10-CM

## 2020-08-19 DIAGNOSIS — E78.5 HYPERLIPIDEMIA LDL GOAL <130: ICD-10-CM

## 2020-08-19 DIAGNOSIS — E78.1 HYPERTRIGLYCERIDEMIA: ICD-10-CM

## 2020-08-19 DIAGNOSIS — R73.01 IMPAIRED FASTING GLUCOSE: ICD-10-CM

## 2020-08-19 PROCEDURE — 99214 OFFICE O/P EST MOD 30 MIN: CPT | Mod: 95 | Performed by: NURSE PRACTITIONER

## 2020-08-19 RX ORDER — ATORVASTATIN CALCIUM 10 MG/1
10 TABLET, FILM COATED ORAL DAILY
Qty: 90 TABLET | Refills: 0 | Status: SHIPPED | OUTPATIENT
Start: 2020-08-19 | End: 2020-11-19

## 2020-08-19 RX ORDER — OMEGA-3 FATTY ACIDS CAP DELAYED RELEASE 1000 MG 1000 MG
4 CAPSULE DELAYED RELEASE ORAL DAILY
Qty: 360 CAPSULE | Refills: 3 | Status: SHIPPED | OUTPATIENT
Start: 2020-08-19 | End: 2021-08-23

## 2020-08-19 RX ORDER — LOSARTAN POTASSIUM 25 MG/1
25 TABLET ORAL DAILY
Qty: 90 TABLET | Refills: 1 | Status: SHIPPED | OUTPATIENT
Start: 2020-08-19 | End: 2021-02-25

## 2020-08-19 RX ORDER — FLUOXETINE 10 MG/1
CAPSULE ORAL
Qty: 90 CAPSULE | Refills: 1 | Status: SHIPPED | OUTPATIENT
Start: 2020-08-19 | End: 2021-02-25

## 2020-08-19 NOTE — PROGRESS NOTES
"Carolyn Saldivar is a 59 year old female who is being evaluated via a billable telephone visit.      The patient has been notified of following:     \"This telephone visit will be conducted via a call between you and your physician/provider. We have found that certain health care needs can be provided without the need for a physical exam.  This service lets us provide the care you need with a short phone conversation.  If a prescription is necessary we can send it directly to your pharmacy.  If lab work is needed we can place an order for that and you can then stop by our lab to have the test done at a later time.    Telephone visits are billed at different rates depending on your insurance coverage. During this emergency period, for some insurers they may be billed the same as an in-person visit.  Please reach out to your insurance provider with any questions.    If during the course of the call the physician/provider feels a telephone visit is not appropriate, you will not be charged for this service.\"    Patient has given verbal consent for Telephone visit?  Yes    What phone number would you like to be contacted at? [phone]    How would you like to obtain your AVS? Mail a copy    Subjective     Carolyn Saldivar is a 59 year old female who presents via phone visit today for the following health issues:    HPI     LAB RESULTS  would like to go over results and plan of care       The 10-year ASCVD risk score (Lindseyadriano BHAKTA Jr., et al., 2013) is: 5.7%    Values used to calculate the score:      Age: 59 years      Sex: Female      Is Non- : No      Diabetic: No      Tobacco smoker: No      Systolic Blood Pressure: 122 mmHg      Is BP treated: Yes      HDL Cholesterol: 42 mg/dL      Total Cholesterol: 247 mg/dL    BP Readings from Last 3 Encounters:   08/10/20 122/74   06/20/20 138/74   12/24/19 126/88         Review of Systems   Constitutional: Negative.    Respiratory: Negative.  "   Cardiovascular: Negative.    Neurological: Negative.              Objective          Vitals:  No vitals were obtained today due to virtual visit.    healthy, alert and no distress  PSYCH: Alert and oriented times 3; coherent speech, normal   rate and volume, able to articulate logical thoughts, able   to abstract reason, no tangential thoughts, no hallucinations   or delusions  Her affect is normal  RESP: No cough, no audible wheezing, able to talk in full sentences  Remainder of exam unable to be completed due to telephone visits    Diagnostic:  Reviewed labs in [facility]    The 10-year ASCVD risk score (Lindsey BHAKTA Jr., et al., 2013) is: 6.1%    Values used to calculate the score:      Age: 60 years      Sex: Female      Is Non- : No      Diabetic: No      Tobacco smoker: No      Systolic Blood Pressure: 122 mmHg      Is BP treated: Yes      HDL Cholesterol: 42 mg/dL      Total Cholesterol: 247 mg/dL      Assessment/Plan:    Assessment & Plan     Benign essential hypertension  - Refilled as she is due for refills.   - Follow up in 6 months.   -  BP Readings from Last 3 Encounters:   08/10/20 122/74   06/20/20 138/74   12/24/19 126/88       - losartan (COZAAR) 25 MG tablet; Take 1 tablet (25 mg) by mouth daily    Obesity (BMI 30.0-34.9)  - Discussed need for weight loss option including diet changes.   - Open to seeing a nutritionist for this.   - NUTRITION REFERRAL    Hypertriglyceridemia  - Had a long discussion about her triglycerides and risks of cardiovascular disease, pancreatitis risks and DM.   - Advised start statin medication and Fish oil along with lifestyle modifications to avoid progression of her cholesterol, DM and heart diease. Patient agreed with plan.   - Follow up on labs in 3 months.   - Advised to have hemoglobin A1C checked given impaired fasting glucose and risks with cholesterol levels.   - atorvastatin (LIPITOR) 10 MG tablet; Take 1 tablet (10 mg) by mouth daily  - Lipid  panel reflex to direct LDL Fasting; Future  - Omega-3 Fatty Acids (FISH OIL-OMEGA-3 FATTY ACID) 1000 MG DR capsule; Take 4 capsules (4 g) by mouth daily    Impaired fasting glucose  - Check hemoglobin A1C    Moderate major depression (H)  - Stable   - FLUoxetine (PROZAC) 10 MG capsule; TAKE 1 CAPSULE BY MOUTH EVERY DAY    Generalized anxiety disorder  - Stable   - FLUoxetine (PROZAC) 10 MG capsule; TAKE 1 CAPSULE BY MOUTH EVERY DAY    Hyperlipidemia LDL goal <130  - As noted above   - atorvastatin (LIPITOR) 10 MG tablet; Take 1 tablet (10 mg) by mouth daily  - Lipid panel reflex to direct LDL Fasting; Future       The patient indicates understanding of these issues and agrees with the plan.    Patient Instructions   - Start Lipitor nightly   - Recommend diet changes to help with cholesterol as we discussed and follow up with nutritionist  - Start daily Fish Oil 4Grams daily in one or two doses (2Grams twice daily or 4Grams once daily).  I sent a prescription for this, you can also get it over the counter depending on insurance.   - Follow up to have hemoglobin A1C checked in 1-2 weeks. Lab orders are in.   - Continue all other medications as discussed.       SHERRELL uY CNP  Questions or concerns please feel free to send me a [employer] message or call me  Phone : [phone]          Return in about 3 months (around 11/19/2020) for Lab Work.    SHERRELL Yu CNP  [facility]    Phone call duration: 15 minutes

## 2020-08-19 NOTE — TELEPHONE ENCOUNTER
"KV check out note: \"Schedule lab for hemoglobin A1C in 1-2 weeks   Schedule fasting labs in 3 months.   Mail out AVS\"    Can you please help with this?  "

## 2020-08-19 NOTE — PATIENT INSTRUCTIONS
- Start Lipitor nightly   - Recommend diet changes to help with cholesterol as we discussed and follow up with nutritionist  - Start daily Fish Oil 4Grams daily in one or two doses (2Grams twice daily or 4Grams once daily).  I sent a prescription for this, you can also get it over the counter depending on insurance.   - Follow up to have hemoglobin A1C checked in 1-2 weeks. Lab orders are in.   - Continue all other medications as discussed.       SHERRELL Yu CNP  Questions or concerns please feel free to send me a [employer] message or call me  Phone : [phone]

## 2020-08-20 ENCOUNTER — TELEPHONE (OUTPATIENT)
Dept: FAMILY MEDICINE | Facility: OTHER | Age: 60
End: 2020-08-20

## 2020-08-20 NOTE — TELEPHONE ENCOUNTER
Called to schedule patient. She is going to do this in [place] with her other appointment. Transferred her to they scheduiln line to set up.     Jose Luis Sesay,

## 2020-08-20 NOTE — TELEPHONE ENCOUNTER
Pharmacy Note: Omega-3 Fatty Acids (FISH OIL-OMEGA-3 FATTY ACID) 1000 MG  capsule    Will MD please clarify enteric coated fish oil caps? Are you just wanting an over the counter fish oil or should we dispense Lovaza? Drug is a capsule so its not enteric coated. Thank you

## 2020-08-21 ASSESSMENT — ENCOUNTER SYMPTOMS
NEUROLOGICAL NEGATIVE: 1
CONSTITUTIONAL NEGATIVE: 1
RESPIRATORY NEGATIVE: 1
CARDIOVASCULAR NEGATIVE: 1

## 2020-09-02 ENCOUNTER — OFFICE VISIT (OUTPATIENT)
Dept: OPHTHALMOLOGY | Facility: CLINIC | Age: 60
End: 2020-09-02
Payer: COMMERCIAL

## 2020-09-02 DIAGNOSIS — H02.9 EYELID LESION: Primary | ICD-10-CM

## 2020-09-02 PROCEDURE — 99203 OFFICE O/P NEW LOW 30 MIN: CPT | Performed by: OPHTHALMOLOGY

## 2020-09-02 ASSESSMENT — CONF VISUAL FIELD
OS_NORMAL: 1
OD_NORMAL: 1
METHOD: COUNTING FINGERS

## 2020-09-02 ASSESSMENT — VISUAL ACUITY
OS_SC: 20/25
OD_SC: 20/25
OD_SC+: +2
METHOD: SNELLEN - LINEAR

## 2020-09-02 ASSESSMENT — TONOMETRY
IOP_METHOD: ICARE
OS_IOP_MMHG: 12
OD_IOP_MMHG: 12

## 2020-09-02 NOTE — LETTER
2020         RE:  :  MRN: Carolyn Saldivar  1960  [id number]     Dear Dr. Mena,    Thank you for asking me to see your patient, Carolyn Saldivar, for an oculoplastic   consultation.  My assessment and plan are below.  For further details, please see my attached clinic note.      Chief Complaint(s) and History of Present Illness(es)     Xanthelasma            Comments     Patient presents for an evaluation for eyelid lesions each eye. Patient has   had removed a couple of times but they come back. Thinks last time was   about 6 years ago. Has noticed for about 1 year this time.    Patient denies pain or irritation.    Ocular meds: none      Was told they are cholesterol deposition. No pain but it is irritating and getting larger. Did have cholesterol checked and triglycerides were elevated, started on atorvastatin. No history of skin cancer. From record review does not appear the lesions have ever been sent to pathology.     Assessment & Plan     Carolyn Saldivar is a 60 year old female with the following diagnoses:   Encounter Diagnosis   Name Primary?     Eyelid lesion Yes     Favor xanthelasma. Enlarging and recurrent despite exicision x 3 elsewhere and no prior path. Recommend excisional biopsy.    Will scheudule.     Attending Physician Attestation: Complete documentation of historical and exam elements from today's encounter can be found in the full encounter summary report (not reduplicated in this progress note). I personally obtained the chief complaint(s) and history of present illness. I confirmed and edited as necessary the review of systems, past medical/surgical history, family history, social history, and examination findings as documented by others; and I examined the patient myself. I personally reviewed the relevant tests, images, and reports as documented above. I formulated and edited as necessary the assessment and plan and discussed the findings and management plan with the patient.    -Glen Emanuel MD          Again, thank you for allowing me to participate in the care of your patient.      Sincerely,    Glen Emanuel MD  Department of Ophthalmology and Visual Neurosciences  [facility]    CC: [facility]  [address]  [place] [address]  VIA In Basket

## 2020-09-02 NOTE — NURSING NOTE
Chief Complaints and History of Present Illnesses   Patient presents with     Xanthelasma       Chief Complaint(s) and History of Present Illness(es)     Xanthelasma               Comments     Patient presents for an evaluation for xanthelasma each eye. Patient has had removed a couple of times but they come back. Thinks last time was about 6 years ago. Has noticed for about 1 year this time.    Patient denies pain or irritation.    Ocular meds: none                Melanie Jeans, OA

## 2020-09-02 NOTE — PROGRESS NOTES
Chief Complaint(s) and History of Present Illness(es)     Xanthelasma            Comments     Patient presents for an evaluation for eyelid lesions each eye. Patient has   had removed a couple of times but they come back. Thinks last time was   about 6 years ago. Has noticed for about 1 year this time.    Patient denies pain or irritation.    Ocular meds: none      Was told they are cholesterol deposition. No pain but it is irritating and getting larger. Did have cholesterol checked and triglycerides were elevated, started on atorvastatin. No history of skin cancer. From record review does not appear the lesions have ever been sent to pathology.     Assessment & Plan     Carolyn Saldivar is a 60 year old female with the following diagnoses:   Encounter Diagnosis   Name Primary?     Eyelid lesion Yes     Favor xanthelasma. Enlarging and recurrent despite exicision x 3 elsewhere and no prior path. Recommend excisional biopsy. Prior auth [id number] .     Will butch.     Attending Physician Attestation: Complete documentation of historical and exam elements from today's encounter can be found in the full encounter summary report (not reduplicated in this progress note). I personally obtained the chief complaint(s) and history of present illness. I confirmed and edited as necessary the review of systems, past medical/surgical history, family history, social history, and examination findings as documented by others; and I examined the patient myself. I personally reviewed the relevant tests, images, and reports as documented above. I formulated and edited as necessary the assessment and plan and discussed the findings and management plan with the patient.   -Glen Emanuel MD

## 2020-09-03 ENCOUNTER — NURSE TRIAGE (OUTPATIENT)
Dept: FAMILY MEDICINE | Facility: OTHER | Age: 60
End: 2020-09-03

## 2020-09-03 NOTE — TELEPHONE ENCOUNTER
"Spoke to patient.     This summer had bouts of SOB and cough and can't get rid of.     Cough and SOB is back.   Started again in the last few days.   SOB is getting to be more of a problem than the cough.   Treated with Abx and prednisone and another medication.     Cough is mucousy/ barky.     RN advised being seen in  as there are no appointments available in clinic. Patient states understanding. States she will call [facility] to see if they take her insurance.    Blanca Kimble RN BSN        Additional Information    Negative: Breathing stopped and hasn't returned    Negative: Choking on something    Negative: SEVERE difficulty breathing (e.g., struggling for each breath, speaks in single words, pulse > 120)    Negative: Bluish (or gray) lips or face    Negative: Difficult to awaken or acting confused (e.g., disoriented, slurred speech)    Negative: Passed out (i.e., fainted, collapsed and was not responding)    Negative: Wheezing started suddenly after medicine, an allergic food, or bee sting    Negative: Stridor    Negative: Slow, shallow and weak breathing    Negative: Sounds like a life-threatening emergency to the triager    Negative: Chest pain    Negative: Wheezing (high pitched whistling sound) and previous asthma attacks or use of asthma medicines    Negative: Difficulty breathing and only present when coughing    Negative: Difficulty breathing and only from stuffy or runny nose    Negative: MODERATE difficulty breathing (e.g., speaks in phrases, SOB even at rest, pulse 100-120) of new onset or worse than normal    Negative: Wheezing can be heard across the room    Negative: Drooling or spitting out saliva (because can't swallow)    Negative: Any history of prior \"blood clot\" in leg or lungs    Negative: Recent illness requiring prolonged bedrest (i.e., immobilization)    Negative: Hip or leg fracture in past 2 months (e.g., or had cast on leg or ankle)    Negative: Major surgery in the past month    " "Negative: Recent long-distance travel with prolonged time in car, bus, plane, or train (i.e., within past 2 weeks; 6 or  more hours duration)    Negative: Extra heart beats OR irregular heart beating   (i.e., \"palpitations\")    Negative: Fever > 103 F (39.4 C)    Negative: Fever > 101 F (38.3 C) and over 60 years of age    Negative: Fever > 100.0 F (37.8 C) and bedridden (e.g., nursing home patient, stroke, chronic illness, recovering from surgery)    Negative: Fever > 100.0 F (37.8 C) and diabetes mellitus or weak immune system (e.g., HIV positive, cancer chemo, splenectomy, organ transplant, chronic steroids)    Negative: Periods where breathing stops and then resumes normally and bedridden (e.g., nursing home patient, CVA)    Negative: Pregnant or postpartum (from 0 to 6 weeks after delivery)    Negative: Patient sounds very sick or weak to the triager    MILD difficulty breathing (e.g., minimal/no SOB at rest, SOB with walking, pulse < 100) of new onset or worse than normal    Protocols used: BREATHING DIFFICULTY-A-OH      "

## 2020-09-04 ENCOUNTER — TELEPHONE (OUTPATIENT)
Dept: FAMILY MEDICINE | Facility: OTHER | Age: 60
End: 2020-09-04

## 2020-09-04 ENCOUNTER — ANCILLARY PROCEDURE (OUTPATIENT)
Dept: MAMMOGRAPHY | Facility: CLINIC | Age: 60
End: 2020-09-04
Attending: NURSE PRACTITIONER
Payer: COMMERCIAL

## 2020-09-04 DIAGNOSIS — Z12.31 ENCOUNTER FOR SCREENING MAMMOGRAM FOR BREAST CANCER: ICD-10-CM

## 2020-09-04 DIAGNOSIS — Z87.891 FORMER SMOKER: Primary | ICD-10-CM

## 2020-09-04 DIAGNOSIS — R05.9 COUGH: ICD-10-CM

## 2020-09-04 PROCEDURE — 77063 BREAST TOMOSYNTHESIS BI: CPT

## 2020-09-04 PROCEDURE — 77067 SCR MAMMO BI INCL CAD: CPT

## 2020-09-04 NOTE — TELEPHONE ENCOUNTER
Patient was seen at urgent care yesterday and given a referral to Pulmonology for cough & shortness of breath ([facility] in [place]) patient is requesting a referral to a  Pulmonologist that is closer than that/.  Please call with questions, ok to leave message if need be.

## 2020-09-04 NOTE — TELEPHONE ENCOUNTER
Please make a virtual to discuss if she needs anything in the meantime. I put a referral in please address where she would like to go for this.       SHERRELL Yu CNP  Questions or concerns please feel free to send me a [email or web address] message or call me  Phone : [phone]

## 2020-09-09 ENCOUNTER — VIRTUAL VISIT (OUTPATIENT)
Dept: PULMONOLOGY | Facility: CLINIC | Age: 60
End: 2020-09-09
Payer: COMMERCIAL

## 2020-09-09 DIAGNOSIS — R06.02 SOB (SHORTNESS OF BREATH): Primary | ICD-10-CM

## 2020-09-09 PROCEDURE — 99204 OFFICE O/P NEW MOD 45 MIN: CPT | Mod: 95

## 2020-09-09 NOTE — PROGRESS NOTES
"Carolyn Saldivar is a 60 year old female who is being evaluated via a billable video visit.      The patient has been notified of following:     \"This video visit will be conducted via a call between you and your physician/provider. We have found that certain health care needs can be provided without the need for an in-person physical exam.  This service lets us provide the care you need with a video conversation.  If a prescription is necessary we can send it directly to your pharmacy.  If lab work is needed we can place an order for that and you can then stop by our lab to have the test done at a later time.    Video visits are billed at different rates depending on your insurance coverage.  Please reach out to your insurance provider with any questions.    If during the course of the call the physician/provider feels a video visit is not appropriate, you will not be charged for this service.\"    Patient has given verbal consent for Video visit? Yes  How would you like to obtain your AVS? Mail a copy  If you are dropped from the video visit, the video invite should be resent to: Text to cell phone:  [phone]   Will anyone else be joining your video visit? No      [facility]  Pulmonary Medicine  Visit Clinic Note  September 9, 2020         ASSESSMENT & PLAN       Cough and shortness of breath: I suspect that she has asthma.  She has allergies, and she has symptoms that are responsive to prednisone.  She is living in a relatively new environmental setting in which she has lived in for most of her life.  Additionally her daughter is a new dog that she is exposed to.  I like to get pulmonary function testing show evidence of bronchial hyperresponsiveness.  However if there is no response to albuterol, I still will treat her for asthma.  Ideally I would get a methacholine challenge test, but we do not perform these right now with the coronavirus.  If there is no response to typical asthma " therapy, I think a chest CT scan would be the next diagnostic step.    I will arrange for her to get pulmonary function testing.  At the same time that she comes in for that, she can get an influenza vaccine.        I will call her after I get the results of this test.      Jermaine Forte MD          Today's visit note:     Chief Complaint: Carolyn Saldivar is a 60 year old year old female who is being seen for Consult      HISTORY OF PRESENT ILLNESS:  This is a 60-year-old female who is presenting to the pulmonary clinic today to discuss cough.  This visit was a video visit.    She started having breathing and coughing issues at the beginning of June.  At that time she was evaluated and given 3 days of prednisone and 5 days of antibiotics.  It helped for about 3 weeks and then her symptoms recurred in July.  At that time she was given 7 days of prednisone and 20 days of antibiotics.  That again helped out her symptoms until about 2 weeks ago where she started to develop shortness of breath again.  She thinks that steroids helped her symptoms out more than the antibiotics did.  Currently she is on a Flovent HFA inhaler at 110 mcg twice a day.  She is taking albuterol as needed.    She does have a history of seasonal allergies, as well as allergic to cats, dogs, dust.  She had all of these symptoms once before 15 years ago, but has not had any in the interim.  She lives 6 months out of the year in [place], and the other 6 months out of the year in [place].  Currently she lives in [place], and she has lived in that city for the last 3 years.  Previously she was living in [place].  Her daughter has a new dog, and she sees a dog on occasion.  She is living in a new house in [place].    She does have sinus congestion issues with allergies, and she had some of these at the start of all of her symptoms back in June.  She has occasional heartburn symptoms which is well managed with Tums.  No skin rashes, joint pains or  muscle aches.             Past Medical and Surgical History:     Past Medical History:   Diagnosis Date     Anxiety      Depressive disorder      Hypertension      Past Surgical History:   Procedure Laterality Date     C CERV SPINE FUSN,ANTER,BELOW C2  2008    [facility]  2010           Family History:     Family History   Problem Relation Age of Onset     Heart Failure Mother               Social History:     Social History     Socioeconomic History     Marital status:      Spouse name: Not on file     Number of children: Not on file     Years of education: Not on file     Highest education level: Not on file   Occupational History     Not on file   Social Needs     Financial resource strain: Not on file     Food insecurity     Worry: Not on file     Inability: Not on file     Transportation needs     Medical: Not on file     Non-medical: Not on file   Tobacco Use     Smoking status: Former Smoker     Packs/day: 0.50     Years: 15.00     Pack years: 7.50     Types: Cigarettes     Start date:      Last attempt to quit:      Years since quittin.     Smokeless tobacco: Never Used   Substance and Sexual Activity     Alcohol use: Yes     Comment: occasionally     Drug use: Never     Sexual activity: Not Currently   Lifestyle     Physical activity     Days per week: Not on file     Minutes per session: Not on file     Stress: Not on file   Relationships     Social connections     Talks on phone: Not on file     Gets together: Not on file     Attends [employer] service: Not on file     Active member of club or organization: Not on file     Attends meetings of clubs or organizations: Not on file     Relationship status: Not on file     Intimate partner violence     Fear of current or ex partner: Not on file     Emotionally abused: Not on file     Physically abused: Not on file     Forced sexual activity: Not on file   Other Topics Concern     Not on file   Social History  Narrative     Not on file            Medications:     Current Outpatient Medications   Medication     albuterol (PROAIR HFA) 108 (90 Base) MCG/ACT inhaler     atorvastatin (LIPITOR) 10 MG tablet     FLUoxetine (PROZAC) 10 MG capsule     fluticasone (FLONASE) 50 MCG/ACT nasal spray     loratadine-pseudoePHEDrine (CLARITIN-D 24-HOUR)  MG 24 hr tablet     losartan (COZAAR) 25 MG tablet     Omega-3 Fatty Acids (FISH OIL-OMEGA-3 FATTY ACID) 1000 MG DR capsule     No current facility-administered medications for this visit.             Review of Systems:       A complete review of systems was otherwise negative except as noted in the HPI.      PHYSICAL EXAM:  LMP  (LMP Unknown)      Physical exam was not performed as this was a video visit.           Data:   All laboratory and imaging data reviewed.      PFT:   Pulmonary function testing not available  PFT Interpretation:  Not available      CXR: I reviewed her chest x-ray images and agree with radiologist interpretation below.  These were from June 2020:  No acute airspace infiltrate                              Video-Visit Details    Type of service:  Video Visit    Video Start Time: 11:04  Video End Time: 11:24    Originating Location (pt. Location): Home    Distant Location (provider location):  [facility]     Platform used for Video Visit: Nirav Forte MD

## 2020-09-16 ENCOUNTER — OFFICE VISIT (OUTPATIENT)
Dept: OPHTHALMOLOGY | Facility: CLINIC | Age: 60
End: 2020-09-16
Payer: COMMERCIAL

## 2020-09-16 DIAGNOSIS — H02.9 EYELID LESION: Primary | ICD-10-CM

## 2020-09-16 PROCEDURE — 88305 TISSUE EXAM BY PATHOLOGIST: CPT | Mod: 59 | Performed by: OPHTHALMOLOGY

## 2020-09-16 PROCEDURE — 67840 REMOVE EYELID LESION: CPT | Mod: 50 | Performed by: OPHTHALMOLOGY

## 2020-09-16 RX ORDER — ERYTHROMYCIN 5 MG/G
OINTMENT OPHTHALMIC
Qty: 1 TUBE | Refills: 1 | Status: SHIPPED | OUTPATIENT
Start: 2020-09-16 | End: 2021-08-23

## 2020-09-16 NOTE — PATIENT INSTRUCTIONS
Post-operative Instructions  Ophthalmic Plastic and Reconstructive Surgery    Glen Emanuel M.D.     All instructions apply to the operated eye(s) or eyelid(s).    Wound care and personal care  ? Apply ice compresses 15 minutes of every hour while awake for 2 days. As long as there is no further bleeding, after two days, switch to warm water compresses for five minutes, four times a day until seen by your physician.   ? You may shower or wash your hair the day after surgery. Do not go swimming for at least 2 weeks to prevent contamination of your wounds.  ? You may go for walks and light activity is ok, but no heavy (over 15 pounds) lifting, bending or excessive straining for one week.   ? Do not apply make-up to the eyes or eyelids for 2 weeks after surgery.  ? Expect some swelling, bruising, black eye (even into the lower eyelids and cheeks). Also expect serum caking, crusting and discharge from the eye and/or incisions. A small amount of surface bleeding, and depending on the type of surgery, bleeding from the inside of the eyelid, is normal for the first 48 hours.  ? Avoid straining, bending at the waist, or lifting more than 15 pounds for 10 days. Activities that raise your blood pressure can worsen swelling, cause bleeding, and breaking of sutures. Like wise, sleeping with your head slightly elevated for the first several days can help swelling resolve more quickly.   ? Do continue to ambulate (walk) as you normally would - being sedentary after surgery can cause blood clots.   ? Your eye(s) and eyelid(s) may be painful and tender. This is normal after surgery.      Contact information and follow-up  ? Return to the Eye Clinic for a follow-up appointment with your physician as scheduled. If no appointment has been scheduled:   - [facility] eye clinic: [phone] for an appointment with Dr. Emanuel within 1 to 2 weeks from your date of surgery. If you are scheduled for a phone or video  visit for your first postoperative appointment, please e-mail pictures to [email or web address] 1-2 days before your appointment.   -  [facility] eye clinic: [phone] for an appointment with Dr. Emanuel within 1 to 2 weeks from your date of surgery.     ? For severe pain, bleeding, or loss of vision, call the [facility] at [phone] or [facility] at [phone].     After hours or on weekends and holidays, call [phone] and ask to speak with the ophthalmologist on call.    An on call person can be reached after hours for concerns. The on call doctor should not call in medication refill requests after hours or on weekends, so please plan accordingly. An effort has been made to provide adequate pain medications following every surgery, and refills will not be provided in most instances.     Activity restrictions and driving  ? Avoid heavy lifting, bending, exercise or strenuous activity for 1 week after surgery.  You may resume other activities and return to work as tolerated.  ? You may not resume driving if you are using narcotic pain medications (such as Oxycodone, Norco, Percocet, Tylenol #3).    Medications  ? Restart all regular home medications and eye drops. If you take Plavix or  Aspirin on a regular basis, wait for 72 hours after your surgery before restarting these in order to decrease the risk of bleeding complications.  ? Avoid aspirin and aspirin-like medications (Motrin, Aleve, Ibuprofen, [place] etc) for 72 hours to reduce the risk of bleeding. You may take Tylenol (acetaminophen) for pain.  ? In addition to your home medications, take the following post-operative medications as prescribed by your physician.    ? Apply antibiotic ointment to all sutures three times a day.

## 2020-09-16 NOTE — PROGRESS NOTES
PREOPERATIVE DIAGNOSIS:  Bilateral upper eyelid and right lower eyelid lesions.        POSTOPERATIVE DIAGNOSIS:   Bilateral upper eyelid and right lower eyelid lesions.      PROCEDURE:  Bilateral upper eyelid (right 15 mm x 4 mm, left 12 mm x 4 mm) and right lower eyelid lesion (5 x 3mm) excision.        SURGEON:  Glen Emanuel MD        ASSISTANT: None      ANESTHESIA: 1% lidocaine with epinephrine.        COMPLICATIONS:  None.        ESTIMATED BLOOD LOSS:  Less than 5 mL.        SPECIMENS:  Bilateral upper eyelid and right lower eyelid lesions in formalin.        INDICATION:  Carolyn Saldivar  presented with lesions and these were enlarging in size.  She previously had them excised elsewhere but they had not been sent to pathology. After the risks, benefits and alternatives to the proposed procedure were explained, informed consent was obtained.        PROCEDURE:  Carolyn Saldivar was brought to the room and placed supine on the table. The areas were outlined with marking pen and infiltrated with local anesthetic.  The area was prepped and draped in the typical sterile fashion for oculoplastic surgery.  Attention was directed to the right side.  Skin on the upper and lower eyelid was incised following marked lines lesion was excised with a Sammy scissors.  Hemostasis was obtained with high temperature cautery.  The lesion was placed in formalin and sent for pathologic evaluation.  After hemostasis was obtained the skin was then closed with running 6-0 nylon suture in a running fashion.  Attention was directed to the left upper eyelid and the same procedures were performed.  The patient tolerated the procedure well.        Glen Emanuel MD

## 2020-09-18 LAB — COPATH REPORT: NORMAL

## 2020-10-14 ENCOUNTER — TRANSFERRED RECORDS (OUTPATIENT)
Dept: HEALTH INFORMATION MANAGEMENT | Facility: CLINIC | Age: 60
End: 2020-10-14
Payer: COMMERCIAL

## 2020-10-14 ENCOUNTER — HOSPITAL ENCOUNTER (OUTPATIENT)
Dept: RESPIRATORY THERAPY | Facility: CLINIC | Age: 60
Discharge: HOME | End: 2020-10-14
Payer: COMMERCIAL

## 2020-10-14 DIAGNOSIS — R06.02 SOB (SHORTNESS OF BREATH): ICD-10-CM

## 2020-10-14 PROCEDURE — 94729 DIFFUSING CAPACITY: CPT

## 2020-10-14 PROCEDURE — 250N000009 HC RX 250

## 2020-10-14 PROCEDURE — 94726 PLETHYSMOGRAPHY LUNG VOLUMES: CPT

## 2020-10-14 PROCEDURE — 94060 EVALUATION OF WHEEZING: CPT

## 2020-10-14 RX ORDER — ALBUTEROL SULFATE 0.83 MG/ML
2.5 SOLUTION RESPIRATORY (INHALATION) ONCE
Status: COMPLETED | OUTPATIENT
Start: 2020-10-14 | End: 2020-10-14

## 2020-10-14 RX ADMIN — ALBUTEROL SULFATE 2.5 MG: 2.5 SOLUTION RESPIRATORY (INHALATION) at 11:26

## 2020-10-14 NOTE — PROGRESS NOTES
Pre-Bronch    Post-Bronch     Actual Pred %Pred  Actual %Pred %Chng   ---- SPIROMETRY ----                  FVC (L) 3.02 3.03 99   3.16 104 +4    FEV1 (L) 2.22 2.39 92   2.39 100 +8    FEV1/FVC (%) 73 79     76   +3    FEV1/SVC (%) 67 76              FEV1/FEV6 (%) 74 81     76   +3    FEF Max (L/sec) 5.59 6.09 91   5.24 86 -6    FEF 25-75% (L/sec) 1.59 2.18 72   2.18 99 +37    FIVC (L) 3.01       3.19   +6    FIF Max (L/sec) 3.34 4.76 70   4.52 94 +35    Expiratory Time (sec) 6.71       6.56   -2    ----LUNG MECHANICS                   MVV (L/min)   89              MEP (cmH2O)                  MIP (cmH2O)                  ---- LUNG VOLUMES ----                  SVC (L) 3.30 3.12 105            IC (L) 2.79 2.61 106            ERV (L) 0.44 0.51 85            FRC(Pleth) (L) 2.55 2.65 96            RV (Pleth) (L) 2.04 1.87 109            TLC (Pleth) (L) 5.34 4.77 111            RV/TLC (Pleth) (%) 38 40              ---- DIFFUSION ----                  DLCOunc (ml/min/mmHg) 18.44 19.47 94            DLCOcor (ml/min/mmHg)   19.47              DL/VA (ml/min/mmHg/L) 3.81 4.24              VA (L) 4.84 4.59 105            IVC (L) 3.29                Hgb (gm/dL)   12-18

## 2020-10-14 NOTE — DISCHARGE INSTRUCTIONS
Thank you for completing pulmonary function testing today.  All results will be scanned into your epic results for your doctor to review.  Please resume taking all your current prescribed medications and diet as directed by your provider.   If you have not heard from your provider about your testing within two weeks and do not have a follow-up appointment scheduled with them please contact your provider about any questions you have concerning your testing.   Thank you  The [facility] Pulmonary Function Lab

## 2020-10-15 LAB
DLCOUNC-%PRED-PRE: 94 %
DLCOUNC-PRE: 18.44 ML/MIN/MMHG
DLCOUNC-PRED: 19.47 ML/MIN/MMHG
ERV-%PRED-PRE: 85 %
ERV-PRE: 0.44 L
ERV-PRED: 0.51 L
EXPTIME-PRE: 6.71 SEC
FEF2575-%PRED-POST: 99 %
FEF2575-%PRED-PRE: 72 %
FEF2575-POST: 2.18 L/SEC
FEF2575-PRE: 1.59 L/SEC
FEF2575-PRED: 2.18 L/SEC
FEFMAX-%PRED-PRE: 91 %
FEFMAX-PRE: 5.59 L/SEC
FEFMAX-PRED: 6.09 L/SEC
FEV1-%PRED-PRE: 92 %
FEV1-PRE: 2.22 L
FEV1FEV6-PRE: 74 %
FEV1FEV6-PRED: 81 %
FEV1FVC-PRE: 73 %
FEV1FVC-PRED: 79 %
FEV1SVC-PRE: 67 %
FEV1SVC-PRED: 76 %
FIFMAX-PRE: 3.34 L/SEC
FRCPLETH-%PRED-PRE: 96 %
FRCPLETH-PRE: 2.55 L
FRCPLETH-PRED: 2.65 L
FVC-%PRED-PRE: 99 %
FVC-PRE: 3.02 L
FVC-PRED: 3.03 L
GAW-%PRED-PRE: 52 %
GAW-PRE: 0.54 L/S/CMH2O
GAW-PRED: 1.03 L/S/CMH2O
IC-%PRED-PRE: 106 %
IC-PRE: 2.79 L
IC-PRED: 2.61 L
RVPLETH-%PRED-PRE: 109 %
RVPLETH-PRE: 2.04 L
RVPLETH-PRED: 1.87 L
SGAW-%PRED-PRE: 160 %
SGAW-PRE: 0.16 1/CMH2O*S
SGAW-PRED: 0.1 1/CMH2O*S
SRAW-%PRED-PRE: 129 %
SRAW-PRE: 6.17 CMH2O*S
SRAW-PRED: 4.76 CMH2O*S
TLCPLETH-%PRED-PRE: 111 %
TLCPLETH-PRE: 5.34 L
TLCPLETH-PRED: 4.77 L
VA-%PRED-PRE: 105 %
VA-PRE: 4.84 L
VC-%PRED-PRE: 105 %
VC-PRE: 3.3 L
VC-PRED: 3.12 L

## 2020-10-26 ENCOUNTER — TELEPHONE (OUTPATIENT)
Dept: PULMONOLOGY | Facility: CLINIC | Age: 60
End: 2020-10-26

## 2020-10-26 NOTE — TELEPHONE ENCOUNTER
I reviewed Ms Saldivar's PFTs.  They look normal.  She did not answer her phone, but I left a voicemail saying that the tests were normal.  She should still be taking an inhaler, and if her cough and shortness of breath are gone she can continue to take the inhaler.     Jermaine Forte MD

## 2020-11-03 ENCOUNTER — TELEPHONE (OUTPATIENT)
Facility: CLINIC | Age: 60
End: 2020-11-03

## 2020-11-03 NOTE — TELEPHONE ENCOUNTER
Reason for Call:  Other appointment    Detailed comments: Patient calling to get the results from the PFT that Dr. Forte ordered. Please call her     Phone Number Patient can be reached at: Home number on file [phone] (home)    Best Time: any    Can we leave a detailed message on this number? YES     Call taken on 11/3/2020 at 12:05 PM by Lisa Alvarado

## 2020-11-03 NOTE — TELEPHONE ENCOUNTER
Called and informed Carolyn that Dr. Forte is back in clinic tomorrow, we do have results in the chart so he can review these tomorrow. She understands and appreciates the call to let her know.     Kendy MILLIGAN, Lead RN, BSN. . .  11/3/2020, 12:18 PM

## 2020-11-05 ENCOUNTER — TELEPHONE (OUTPATIENT)
Dept: PULMONOLOGY | Facility: CLINIC | Age: 60
End: 2020-11-05

## 2020-11-05 DIAGNOSIS — J45.40 MODERATE PERSISTENT ASTHMA WITHOUT COMPLICATION: Primary | ICD-10-CM

## 2020-11-05 RX ORDER — ALBUTEROL SULFATE 90 UG/1
2 AEROSOL, METERED RESPIRATORY (INHALATION) EVERY 4 HOURS PRN
Qty: 1 INHALER | Refills: 5 | Status: SHIPPED | OUTPATIENT
Start: 2020-11-05 | End: 2022-06-02

## 2020-11-05 RX ORDER — FLUTICASONE PROPIONATE 110 UG/1
1 AEROSOL, METERED RESPIRATORY (INHALATION) 2 TIMES DAILY
Qty: 1 INHALER | Refills: 5 | Status: SHIPPED | OUTPATIENT
Start: 2020-11-05 | End: 2022-06-02

## 2020-11-05 NOTE — TELEPHONE ENCOUNTER
Spirometry is normal.      Her symptoms are better now that she is in [place].  I suspect that she has asthma, and will treat her as such.      I refilled her flovent and albuterol.  Flovent is her controller inhaler and albuterol to take as needed.     Jermaine Forte MD

## 2020-11-16 ENCOUNTER — TELEPHONE (OUTPATIENT)
Dept: PULMONOLOGY | Facility: CLINIC | Age: 60
End: 2020-11-16

## 2020-11-16 NOTE — TELEPHONE ENCOUNTER
[facility] Call Center    Phone Message    May a detailed message be left on voicemail: yes     Reason for Call: Medication Question or concern regarding medication   Prescription Clarification  Name of Medication: fluticasone (FLOVENT HFA) 110 MCG/ACT inhaler  Prescribing Provider: Dr. Forte   Pharmacy: [id number] [place], [place] - [facility] [address] [facility] [address]   What on the order needs clarification?Carolyn calling to report that her insurance will not cover this medication and is wondering what the alternatives are.  Please call Carolyn back to discuss          Action Taken: Message routed to:  [facility]    Travel Screening: Not Applicable

## 2020-11-16 NOTE — TELEPHONE ENCOUNTER
Advised alternative to Flovent.  Pt will check with her pharmacy and call back with preferred formulary.

## 2020-11-18 DIAGNOSIS — E78.5 HYPERLIPIDEMIA LDL GOAL <130: ICD-10-CM

## 2020-11-18 DIAGNOSIS — E78.1 HYPERTRIGLYCERIDEMIA: ICD-10-CM

## 2020-11-18 NOTE — TELEPHONE ENCOUNTER
Reason for Call:  Medication or medication refill:    Do you use a [facility]?  Name of the pharmacy and phone number for the current request:  [facility] #[id number] - [place], [place] - [address] [phone]    Name of the medication requested: atorvastatin (LIPITOR) 10 MG tablet    Other request: Patient is call out of medication and needs to get a refill. Thank you    Can we leave a detailed message on this number? YES    Phone number patient can be reached at: Cell number on file:    Telephone Information:   Mobile [phone]       Best Time: anytime    Call taken on 11/18/2020 at 11:07 AM by Beena Curry

## 2020-11-19 RX ORDER — ATORVASTATIN CALCIUM 10 MG/1
10 TABLET, FILM COATED ORAL DAILY
Qty: 90 TABLET | Refills: 0 | Status: SHIPPED | OUTPATIENT
Start: 2020-11-19 | End: 2021-02-15

## 2020-11-19 NOTE — TELEPHONE ENCOUNTER
Refill pending and pharmacy entered. Routing to refill pool.    Cori Ervin CMA ([facility])

## 2020-11-19 NOTE — TELEPHONE ENCOUNTER
Prescription approved per [facility] Refill Protocol.  Mi Ortiz RN  November 19, 2020

## 2020-11-23 NOTE — TELEPHONE ENCOUNTER
Pt has not found a cheaper alternative to Flovent.  She will continue shopping around and call us if she needs an alternative Rx.

## 2021-02-12 DIAGNOSIS — E78.5 HYPERLIPIDEMIA LDL GOAL <130: ICD-10-CM

## 2021-02-12 DIAGNOSIS — E78.1 HYPERTRIGLYCERIDEMIA: ICD-10-CM

## 2021-02-15 RX ORDER — ATORVASTATIN CALCIUM 10 MG/1
TABLET, FILM COATED ORAL
Qty: 90 TABLET | Refills: 0 | Status: SHIPPED | OUTPATIENT
Start: 2021-02-15 | End: 2021-05-13

## 2021-02-15 NOTE — TELEPHONE ENCOUNTER
Prescription approved per [facility] Refill Protocol.  Mi Ortiz RN, BSN  [place]  February 15, 2021

## 2021-04-13 ENCOUNTER — MYC MEDICAL ADVICE (OUTPATIENT)
Dept: FAMILY MEDICINE | Facility: CLINIC | Age: 61
End: 2021-04-13

## 2021-04-13 ASSESSMENT — ANXIETY QUESTIONNAIRES
4. TROUBLE RELAXING: MORE THAN HALF THE DAYS
6. BECOMING EASILY ANNOYED OR IRRITABLE: SEVERAL DAYS
5. BEING SO RESTLESS THAT IT IS HARD TO SIT STILL: SEVERAL DAYS
GAD7 TOTAL SCORE: 8
3. WORRYING TOO MUCH ABOUT DIFFERENT THINGS: MORE THAN HALF THE DAYS
2. NOT BEING ABLE TO STOP OR CONTROL WORRYING: SEVERAL DAYS
7. FEELING AFRAID AS IF SOMETHING AWFUL MIGHT HAPPEN: NOT AT ALL
7. FEELING AFRAID AS IF SOMETHING AWFUL MIGHT HAPPEN: NOT AT ALL
1. FEELING NERVOUS, ANXIOUS, OR ON EDGE: SEVERAL DAYS

## 2021-04-13 ASSESSMENT — PATIENT HEALTH QUESTIONNAIRE - PHQ9
10. IF YOU CHECKED OFF ANY PROBLEMS, HOW DIFFICULT HAVE THESE PROBLEMS MADE IT FOR YOU TO DO YOUR WORK, TAKE CARE OF THINGS AT HOME, OR GET ALONG WITH OTHER PEOPLE: SOMEWHAT DIFFICULT
SUM OF ALL RESPONSES TO PHQ QUESTIONS 1-9: 8
SUM OF ALL RESPONSES TO PHQ QUESTIONS 1-9: 8

## 2021-04-14 ASSESSMENT — PATIENT HEALTH QUESTIONNAIRE - PHQ9: SUM OF ALL RESPONSES TO PHQ QUESTIONS 1-9: 8

## 2021-04-14 ASSESSMENT — ANXIETY QUESTIONNAIRES: GAD7 TOTAL SCORE: 8

## 2021-04-14 NOTE — TELEPHONE ENCOUNTER
Responded via [employer].  Faustino Garcia, DEXTERN, RN, PHN  [facility] [place] [facility]  April 14, 2021

## 2021-04-14 NOTE — TELEPHONE ENCOUNTER
Patient returned PHQ-9 with a high score and answered yes to suicidal thoughts. Please call patient to triage.     Edie Recinos CMA

## 2021-04-20 ENCOUNTER — MYC MEDICAL ADVICE (OUTPATIENT)
Dept: FAMILY MEDICINE | Facility: OTHER | Age: 61
End: 2021-04-20

## 2021-04-20 NOTE — TELEPHONE ENCOUNTER
Please reach out to patient to schedule a colonoscopy at [place] order place back in July of 2020  Thanks

## 2021-04-30 ENCOUNTER — HOSPITAL ENCOUNTER (OUTPATIENT)
Facility: AMBULATORY SURGERY CENTER | Age: 61
End: 2021-04-30
Attending: INTERNAL MEDICINE
Payer: COMMERCIAL

## 2021-04-30 DIAGNOSIS — Z12.11 SCREENING FOR COLON CANCER: Primary | ICD-10-CM

## 2021-05-11 DIAGNOSIS — Z11.59 ENCOUNTER FOR SCREENING FOR OTHER VIRAL DISEASES: ICD-10-CM

## 2021-05-12 DIAGNOSIS — E78.5 HYPERLIPIDEMIA LDL GOAL <130: ICD-10-CM

## 2021-05-12 DIAGNOSIS — E78.1 HYPERTRIGLYCERIDEMIA: ICD-10-CM

## 2021-05-13 RX ORDER — ATORVASTATIN CALCIUM 10 MG/1
TABLET, FILM COATED ORAL
Qty: 90 TABLET | Refills: 0 | Status: SHIPPED | OUTPATIENT
Start: 2021-05-13 | End: 2021-08-23

## 2021-05-13 NOTE — TELEPHONE ENCOUNTER
Pending Prescriptions:                       Disp   Refills    atorvastatin (LIPITOR) 10 MG tablet [Phar*90 tab*0            Sig: TAKE 1 TABLET(10 MG) BY MOUTH DAILY    Medication is being filled for 1 time hakan refill only due to:  Patient is due for yearly visit by 8/2021    Please call and help schedule.  Thank you!

## 2021-05-18 ENCOUNTER — MYC MEDICAL ADVICE (OUTPATIENT)
Dept: FAMILY MEDICINE | Facility: OTHER | Age: 61
End: 2021-05-18

## 2021-05-18 RX ORDER — BISACODYL 5 MG/1
15 TABLET, DELAYED RELEASE ORAL SEE ADMIN INSTRUCTIONS
Qty: 3 TABLET | Refills: 0 | Status: SHIPPED | OUTPATIENT
Start: 2021-05-18 | End: 2021-05-25 | Stop reason: HOSPADM

## 2021-05-18 RX ORDER — SODIUM, POTASSIUM,MAG SULFATES 17.5-3.13G
2 SOLUTION, RECONSTITUTED, ORAL ORAL SEE ADMIN INSTRUCTIONS
Qty: 354 ML | Refills: 0 | Status: SHIPPED | OUTPATIENT
Start: 2021-05-18 | End: 2021-05-25 | Stop reason: HOSPADM

## 2021-05-18 NOTE — TELEPHONE ENCOUNTER
This would ultimately be up to the procedure team, I have not heard any changes on this regardless of vaccination. I believe that this is because there is a small chance that you could still get covid and they want to be sure before operating. So yes she will need to test still.     KV

## 2021-05-21 DIAGNOSIS — Z11.59 ENCOUNTER FOR SCREENING FOR OTHER VIRAL DISEASES: ICD-10-CM

## 2021-05-21 LAB
SARS-COV-2 RNA RESP QL NAA+PROBE: NORMAL
SPECIMEN SOURCE: NORMAL

## 2021-05-21 PROCEDURE — U0003 INFECTIOUS AGENT DETECTION BY NUCLEIC ACID (DNA OR RNA); SEVERE ACUTE RESPIRATORY SYNDROME CORONAVIRUS 2 (SARS-COV-2) (CORONAVIRUS DISEASE [COVID-19]), AMPLIFIED PROBE TECHNIQUE, MAKING USE OF HIGH THROUGHPUT TECHNOLOGIES AS DESCRIBED BY CMS-2020-01-R: HCPCS | Performed by: INTERNAL MEDICINE

## 2021-05-21 PROCEDURE — U0005 INFEC AGEN DETEC AMPLI PROBE: HCPCS | Performed by: INTERNAL MEDICINE

## 2021-05-22 LAB
LABORATORY COMMENT REPORT: NORMAL
SARS-COV-2 RNA RESP QL NAA+PROBE: NEGATIVE
SPECIMEN SOURCE: NORMAL

## 2021-05-24 DIAGNOSIS — F41.1 GENERALIZED ANXIETY DISORDER: ICD-10-CM

## 2021-05-24 DIAGNOSIS — F32.1 MODERATE MAJOR DEPRESSION (H): ICD-10-CM

## 2021-05-24 DIAGNOSIS — I10 BENIGN ESSENTIAL HYPERTENSION: ICD-10-CM

## 2021-05-24 RX ORDER — LIDOCAINE 40 MG/G
CREAM TOPICAL
Status: CANCELLED | OUTPATIENT
Start: 2021-05-24

## 2021-05-24 RX ORDER — PROCHLORPERAZINE MALEATE 10 MG
10 TABLET ORAL EVERY 6 HOURS PRN
Status: CANCELLED | OUTPATIENT
Start: 2021-05-24

## 2021-05-24 RX ORDER — FLUMAZENIL 0.1 MG/ML
0.2 INJECTION, SOLUTION INTRAVENOUS
Status: CANCELLED | OUTPATIENT
Start: 2021-05-24 | End: 2021-05-24

## 2021-05-24 RX ORDER — NALOXONE HYDROCHLORIDE 0.4 MG/ML
0.2 INJECTION, SOLUTION INTRAMUSCULAR; INTRAVENOUS; SUBCUTANEOUS
Status: CANCELLED | OUTPATIENT
Start: 2021-05-24

## 2021-05-24 RX ORDER — NALOXONE HYDROCHLORIDE 0.4 MG/ML
0.4 INJECTION, SOLUTION INTRAMUSCULAR; INTRAVENOUS; SUBCUTANEOUS
Status: CANCELLED | OUTPATIENT
Start: 2021-05-24

## 2021-05-24 RX ORDER — ONDANSETRON 2 MG/ML
4 INJECTION INTRAMUSCULAR; INTRAVENOUS
Status: CANCELLED | OUTPATIENT
Start: 2021-05-24

## 2021-05-24 RX ORDER — ONDANSETRON 4 MG/1
4 TABLET, ORALLY DISINTEGRATING ORAL EVERY 6 HOURS PRN
Status: CANCELLED | OUTPATIENT
Start: 2021-05-24

## 2021-05-24 RX ORDER — ONDANSETRON 2 MG/ML
4 INJECTION INTRAMUSCULAR; INTRAVENOUS EVERY 6 HOURS PRN
Status: CANCELLED | OUTPATIENT
Start: 2021-05-24

## 2021-05-25 ENCOUNTER — HOSPITAL ENCOUNTER (OUTPATIENT)
Facility: CLINIC | Age: 61
End: 2021-05-25
Attending: SURGERY | Admitting: SURGERY
Payer: COMMERCIAL

## 2021-05-25 ENCOUNTER — HOSPITAL ENCOUNTER (OUTPATIENT)
Facility: AMBULATORY SURGERY CENTER | Age: 61
Discharge: HOME | End: 2021-05-25
Attending: INTERNAL MEDICINE | Admitting: INTERNAL MEDICINE
Payer: COMMERCIAL

## 2021-05-25 VITALS
OXYGEN SATURATION: 98 % | HEART RATE: 61 BPM | HEIGHT: 61 IN | RESPIRATION RATE: 14 BRPM | SYSTOLIC BLOOD PRESSURE: 125 MMHG | TEMPERATURE: 98 F | WEIGHT: 175 LBS | BODY MASS INDEX: 33.04 KG/M2 | DIASTOLIC BLOOD PRESSURE: 81 MMHG

## 2021-05-25 LAB — COLONOSCOPY: NORMAL

## 2021-05-25 PROCEDURE — 88305 TISSUE EXAM BY PATHOLOGIST: CPT | Mod: 26 | Performed by: PATHOLOGY

## 2021-05-25 PROCEDURE — 45385 COLONOSCOPY W/LESION REMOVAL: CPT | Mod: 33

## 2021-05-25 RX ORDER — SIMETHICONE
LIQUID (ML) MISCELLANEOUS PRN
Status: DISCONTINUED | OUTPATIENT
Start: 2021-05-25 | End: 2021-05-25 | Stop reason: HOSPADM

## 2021-05-25 RX ORDER — NALOXONE HYDROCHLORIDE 0.4 MG/ML
0.2 INJECTION, SOLUTION INTRAMUSCULAR; INTRAVENOUS; SUBCUTANEOUS
Status: DISCONTINUED | OUTPATIENT
Start: 2021-05-25 | End: 2021-05-26 | Stop reason: HOSPADM

## 2021-05-25 RX ORDER — ONDANSETRON 4 MG/1
4 TABLET, ORALLY DISINTEGRATING ORAL EVERY 6 HOURS PRN
Status: DISCONTINUED | OUTPATIENT
Start: 2021-05-25 | End: 2021-05-26 | Stop reason: HOSPADM

## 2021-05-25 RX ORDER — NALOXONE HYDROCHLORIDE 0.4 MG/ML
0.4 INJECTION, SOLUTION INTRAMUSCULAR; INTRAVENOUS; SUBCUTANEOUS
Status: DISCONTINUED | OUTPATIENT
Start: 2021-05-25 | End: 2021-05-26 | Stop reason: HOSPADM

## 2021-05-25 RX ORDER — ONDANSETRON 2 MG/ML
4 INJECTION INTRAMUSCULAR; INTRAVENOUS
Status: DISCONTINUED | OUTPATIENT
Start: 2021-05-25 | End: 2021-05-26 | Stop reason: HOSPADM

## 2021-05-25 RX ORDER — LIDOCAINE 40 MG/G
CREAM TOPICAL
Status: DISCONTINUED | OUTPATIENT
Start: 2021-05-25 | End: 2021-05-26 | Stop reason: HOSPADM

## 2021-05-25 RX ORDER — FENTANYL CITRATE 50 UG/ML
INJECTION, SOLUTION INTRAMUSCULAR; INTRAVENOUS PRN
Status: DISCONTINUED | OUTPATIENT
Start: 2021-05-25 | End: 2021-05-25 | Stop reason: HOSPADM

## 2021-05-25 RX ORDER — FLUMAZENIL 0.1 MG/ML
0.2 INJECTION, SOLUTION INTRAVENOUS
Status: DISCONTINUED | OUTPATIENT
Start: 2021-05-25 | End: 2021-05-26 | Stop reason: HOSPADM

## 2021-05-25 RX ORDER — ONDANSETRON 2 MG/ML
4 INJECTION INTRAMUSCULAR; INTRAVENOUS EVERY 6 HOURS PRN
Status: DISCONTINUED | OUTPATIENT
Start: 2021-05-25 | End: 2021-05-26 | Stop reason: HOSPADM

## 2021-05-25 RX ORDER — PROCHLORPERAZINE MALEATE 10 MG
10 TABLET ORAL EVERY 6 HOURS PRN
Status: DISCONTINUED | OUTPATIENT
Start: 2021-05-25 | End: 2021-05-26 | Stop reason: HOSPADM

## 2021-05-25 ASSESSMENT — MIFFLIN-ST. JEOR: SCORE: 1296.17

## 2021-05-27 LAB — COPATH REPORT: NORMAL

## 2021-05-27 RX ORDER — FLUOXETINE 10 MG/1
CAPSULE ORAL
Qty: 30 CAPSULE | Refills: 0 | Status: SHIPPED | OUTPATIENT
Start: 2021-05-27 | End: 2021-06-01

## 2021-05-27 RX ORDER — LOSARTAN POTASSIUM 25 MG/1
TABLET ORAL
Qty: 30 TABLET | Refills: 0 | Status: SHIPPED | OUTPATIENT
Start: 2021-05-27 | End: 2021-06-01

## 2021-05-27 NOTE — TELEPHONE ENCOUNTER
Pending Prescriptions:                       Disp   Refills    FLUoxetine (PROZAC) 10 MG capsule [Pharmac*90 cap*0        Sig: TAKE 1 CAPSULE BY MOUTH EVERY DAY    losartan (COZAAR) 25 MG tablet [Pharmacy M*90 tab*0        Sig: TAKE 1 TABLET(25 MG) BY MOUTH DAILY      Routing refill request to provider for review/approval because:  Nalini given x1 and patient did not follow up, please advise    Lorena Min RN on 5/27/2021 at 8:46 AM

## 2021-05-27 NOTE — TELEPHONE ENCOUNTER
Needs OV.       SHERRELL Yu CNP  Questions or concerns please feel free to send me a [employer] message or call me  Phone : [phone]

## 2021-05-28 NOTE — TELEPHONE ENCOUNTER
Ronda -   She said thanks for filling her meds !   She has an annual physical on 8/13/21 with you and wonders if you will keep her refilled until then..

## 2021-06-01 RX ORDER — FLUOXETINE 10 MG/1
10 CAPSULE ORAL DAILY
Qty: 90 CAPSULE | Refills: 0 | Status: SHIPPED | OUTPATIENT
Start: 2021-06-01 | End: 2021-08-23

## 2021-06-01 RX ORDER — LOSARTAN POTASSIUM 25 MG/1
25 TABLET ORAL DAILY
Qty: 90 TABLET | Refills: 0 | Status: SHIPPED | OUTPATIENT
Start: 2021-06-01 | End: 2021-08-23

## 2021-06-01 NOTE — TELEPHONE ENCOUNTER
I sent a new RX for 90 days for both, see her in August.       Ronda Mena, SHERRELL CNP  Questions or concerns please feel free to send me a [employer] message or call me  Phone : [phone]

## 2021-06-03 NOTE — H&P
Pre procedure H and P    HPI    Carolyn presents today for a screening colonoscopy    ROS:    No fevers or chills  No weight loss  No blurry vision, double vision or change in vision  No sore throat  No lymphadenopathy  No headache, paraesthesias, or weakness in a limb  No shortness of breath or wheezing  No chest pain or pressure  No arthralgias or myalgias  No rashes or skin changes  No odynophagia or dysphagia  No BRBPR, hematochezia, melena  No dysuria, frequency or urgency  No hot/cold intolerance or polyria  No anxiety or depression    PROBLEM LIST  Patient Active Problem List    Diagnosis Date Noted     Former smoker 07/07/2020     Priority: Medium     Moderate major depression (H) 03/31/2020     Priority: Medium     Generalized anxiety disorder 03/31/2020     Priority: Medium     Benign essential hypertension 03/31/2020     Priority: Medium     Situational depression 12/12/2016     Priority: Medium     Obesity (BMI 30.0-34.9) 12/10/2015     Priority: Medium     ROSA on CPAP 10/29/2014     Priority: Medium     Hypertriglyceridemia 03/27/2013     Priority: Medium     Status post shoulder surgery 11/15/2010     Priority: Medium     Diverticulosis of colon 05/18/2010     Priority: Medium     IMO Update 10/11       Eczema 02/22/2010     Priority: Medium     Family history of colonic polyps 02/22/2010     Priority: Medium     Two of the three sisters       Routine general medical examination at a health care facility 02/22/2010     Priority: Medium     IMO Update 10/11       Heartburn 12/30/2009     Priority: Medium     Allergic rhinitis due to pollen 05/14/2007     Priority: Medium     Carpal tunnel syndrome 03/13/2007     Priority: Medium       PERTINENT PAST MEDICAL HISTORY:  Past Medical History:   Diagnosis Date     Anxiety      Depressive disorder      Hypertension      Sleep apnea        PREVIOUS SURGERIES:  Past Surgical History:   Procedure Laterality Date     C CERV SPINE FUSN,ANTER,BELOW C2  06/08/2008     [facility]     COLONOSCOPY  05/18/2010     COLONOSCOPY N/A 5/25/2021    Procedure: COLONOSCOPY, WITH POLYPECTOMY;  Surgeon: Ruby Otero DO;  Location: [facility] OR           ALLERGIES:     Allergies   Allergen Reactions     Cats      Dogs      Seasonal Allergies Other (See Comments)     Birch trees, maple trees, aramis grass, orchard grass, mugwart, lamb's quarter, dust mites       PERTINENT MEDICATIONS:    Current Outpatient Medications:      atorvastatin (LIPITOR) 10 MG tablet, TAKE 1 TABLET(10 MG) BY MOUTH DAILY, Disp: 90 tablet, Rfl: 0     fluticasone (FLONASE) 50 MCG/ACT nasal spray, INSTILL 1 SPRAY NASALLY ONE TIME A DAY. SHAKE GENTLY BEFORE EACH USE; ALTERNATE NOSTRILS WITH EACH SPRAY., Disp: , Rfl:      fluticasone (FLOVENT HFA) 110 MCG/ACT inhaler, Inhale 1 puff into the lungs 2 times daily, Disp: 1 Inhaler, Rfl: 5     loratadine-pseudoePHEDrine (CLARITIN-D 24-HOUR)  MG 24 hr tablet, Take 1 tablet by mouth daily, Disp: , Rfl:      Omega-3 Fatty Acids (FISH OIL-OMEGA-3 FATTY ACID) 1000 MG DR capsule, Take 4 capsules (4 g) by mouth daily, Disp: 360 capsule, Rfl: 3     albuterol (PROAIR HFA) 108 (90 Base) MCG/ACT inhaler, Inhale 2 puffs into the lungs every 4 hours as needed for shortness of breath / dyspnea, Disp: 1 Inhaler, Rfl: 5     erythromycin (ROMYCIN) 5 MG/GM ophthalmic ointment, Apply to each eye three times daily and as needed, Disp: 1 Tube, Rfl: 1     FLUoxetine (PROZAC) 10 MG capsule, Take 1 capsule (10 mg) by mouth daily, Disp: 90 capsule, Rfl: 0     losartan (COZAAR) 25 MG tablet, Take 1 tablet (25 mg) by mouth daily, Disp: 90 tablet, Rfl: 0    SOCIAL HISTORY:  Social History     Socioeconomic History     Marital status:      Spouse name: Not on file     Number of children: Not on file     Years of education: Not on file     Highest education level: Not on file   Occupational History     Not on file   Social Needs     Financial resource strain: Not on file     Food insecurity      "Worry: Not on file     Inability: Not on file     Transportation needs     Medical: Not on file     Non-medical: Not on file   Tobacco Use     Smoking status: Former Smoker     Packs/day: 0.50     Years: 15.00     Pack years: 7.50     Types: Cigarettes     Start date:      Quit date:      Years since quittin.4     Smokeless tobacco: Never Used   Substance and Sexual Activity     Alcohol use: Yes     Comment: occasionally     Drug use: Never     Sexual activity: Not Currently   Lifestyle     Physical activity     Days per week: Not on file     Minutes per session: Not on file     Stress: Not on file   Relationships     Social connections     Talks on phone: Not on file     Gets together: Not on file     Attends Restorationist service: Not on file     Active member of club or organization: Not on file     Attends meetings of clubs or organizations: Not on file     Relationship status: Not on file     Intimate partner violence     Fear of current or ex partner: Not on file     Emotionally abused: Not on file     Physically abused: Not on file     Forced sexual activity: Not on file   Other Topics Concern     Not on file   Social History Narrative     Not on file       FAMILY HISTORY:  Family History   Problem Relation Age of Onset     Heart Failure Mother        Past/family/social history reviewed and no changes    PHYSICAL EXAMINATION:  Constitutional: aaox3, cooperative, pleasant, not dyspneic/diaphoretic, no acute distress  Vitals reviewed: /81   Pulse 61   Temp 98  F (36.7  C) (Temporal)   Resp 14   Ht 1.549 m (5' 1\")   Wt 79.4 kg (175 lb)   LMP  (LMP Unknown)   SpO2 98%   Breastfeeding No   BMI 33.07 kg/m    Wt:   Wt Readings from Last 2 Encounters:   21 79.4 kg (175 lb)   08/10/20 81.9 kg (180 lb 9.6 oz)      Eyes: Sclera anicteric/injected  Ears/nose/mouth/throat: Normal oropharynx without ulcers or exudate, mucus membranes moist, hearing intact  Neck: supple, thyroid normal " size  CV: No edema  Respiratory: Unlabored breathing  Lymph: No axillary, submandibular, supraclavicular or inguinal lymphadenopathy  Abd:  Nondistended, +bs, no hepatosplenomegaly, nontender, no peritoneal signs  Skin: warm, perfused, no jaundice  Psych: Normal affect  MSK: Normal gait      PERTINENT STUDIES:  Most recent CBC:  No lab results found.  Most recent hepatic panel:  Recent Labs   Lab Test 08/10/20  1041   ALT 22   AST 14     Most recent creatinine:  Recent Labs   Lab Test 08/10/20  1041   CR 0.75       ASSESSMENT/PLAN:    Screening colonoscopy today with conscious sedation

## 2021-08-23 ENCOUNTER — OFFICE VISIT (OUTPATIENT)
Dept: FAMILY MEDICINE | Facility: OTHER | Age: 61
End: 2021-08-23
Payer: COMMERCIAL

## 2021-08-23 ENCOUNTER — TRANSFERRED RECORDS (OUTPATIENT)
Dept: HEALTH INFORMATION MANAGEMENT | Facility: CLINIC | Age: 61
End: 2021-08-23

## 2021-08-23 VITALS
SYSTOLIC BLOOD PRESSURE: 126 MMHG | HEART RATE: 78 BPM | RESPIRATION RATE: 18 BRPM | HEIGHT: 62 IN | DIASTOLIC BLOOD PRESSURE: 84 MMHG | BODY MASS INDEX: 31.65 KG/M2 | WEIGHT: 172 LBS | TEMPERATURE: 97.5 F | OXYGEN SATURATION: 98 %

## 2021-08-23 DIAGNOSIS — Z00.00 ROUTINE GENERAL MEDICAL EXAMINATION AT A HEALTH CARE FACILITY: ICD-10-CM

## 2021-08-23 DIAGNOSIS — Z11.4 SCREENING FOR HIV (HUMAN IMMUNODEFICIENCY VIRUS): ICD-10-CM

## 2021-08-23 DIAGNOSIS — I10 BENIGN ESSENTIAL HYPERTENSION: ICD-10-CM

## 2021-08-23 DIAGNOSIS — E78.5 HYPERLIPIDEMIA LDL GOAL <130: ICD-10-CM

## 2021-08-23 DIAGNOSIS — K57.30 DIVERTICULOSIS OF COLON: ICD-10-CM

## 2021-08-23 DIAGNOSIS — Z00.00 ENCOUNTER FOR ROUTINE ADULT HEALTH EXAMINATION WITHOUT ABNORMAL FINDINGS: Primary | ICD-10-CM

## 2021-08-23 DIAGNOSIS — G47.33 OSA ON CPAP: ICD-10-CM

## 2021-08-23 DIAGNOSIS — Z98.890 S/P CERVICAL DISC REPLACEMENT: ICD-10-CM

## 2021-08-23 DIAGNOSIS — E78.1 HYPERTRIGLYCERIDEMIA: ICD-10-CM

## 2021-08-23 DIAGNOSIS — G44.209 TENSION HEADACHE: ICD-10-CM

## 2021-08-23 DIAGNOSIS — F41.1 GENERALIZED ANXIETY DISORDER: ICD-10-CM

## 2021-08-23 DIAGNOSIS — F32.1 MODERATE MAJOR DEPRESSION (H): ICD-10-CM

## 2021-08-23 DIAGNOSIS — Z86.2 HISTORY OF ANEMIA: ICD-10-CM

## 2021-08-23 LAB
ALBUMIN SERPL-MCNC: 4 G/DL (ref 3.4–5)
ALP SERPL-CCNC: 77 U/L (ref 40–150)
ALT SERPL W P-5'-P-CCNC: 25 U/L (ref 0–50)
ANION GAP SERPL CALCULATED.3IONS-SCNC: 2 MMOL/L (ref 3–14)
AST SERPL W P-5'-P-CCNC: 13 U/L (ref 0–45)
BASOPHILS # BLD AUTO: 0 10E3/UL (ref 0–0.2)
BASOPHILS NFR BLD AUTO: 1 %
BILIRUB SERPL-MCNC: 0.5 MG/DL (ref 0.2–1.3)
BUN SERPL-MCNC: 10 MG/DL (ref 7–30)
CALCIUM SERPL-MCNC: 9.1 MG/DL (ref 8.5–10.1)
CHLORIDE BLD-SCNC: 106 MMOL/L (ref 94–109)
CHOLEST SERPL-MCNC: 208 MG/DL
CO2 SERPL-SCNC: 29 MMOL/L (ref 20–32)
CREAT SERPL-MCNC: 0.83 MG/DL (ref 0.52–1.04)
EOSINOPHIL # BLD AUTO: 0.3 10E3/UL (ref 0–0.7)
EOSINOPHIL NFR BLD AUTO: 4 %
ERYTHROCYTE [DISTWIDTH] IN BLOOD BY AUTOMATED COUNT: 13.4 % (ref 10–15)
FASTING STATUS PATIENT QL REPORTED: YES
GFR SERPL CREATININE-BSD FRML MDRD: 76 ML/MIN/1.73M2
GLUCOSE BLD-MCNC: 99 MG/DL (ref 70–99)
HCT VFR BLD AUTO: 40.6 % (ref 35–47)
HDLC SERPL-MCNC: 51 MG/DL
HGB BLD-MCNC: 13.6 G/DL (ref 11.7–15.7)
IRON SATN MFR SERPL: 27 % (ref 15–46)
IRON SERPL-MCNC: 98 UG/DL (ref 35–180)
LDLC SERPL CALC-MCNC: 98 MG/DL
LYMPHOCYTES # BLD AUTO: 2.9 10E3/UL (ref 0.8–5.3)
LYMPHOCYTES NFR BLD AUTO: 44 %
MCH RBC QN AUTO: 29.2 PG (ref 26.5–33)
MCHC RBC AUTO-ENTMCNC: 33.5 G/DL (ref 31.5–36.5)
MCV RBC AUTO: 87 FL (ref 78–100)
MONOCYTES # BLD AUTO: 0.5 10E3/UL (ref 0–1.3)
MONOCYTES NFR BLD AUTO: 8 %
NEUTROPHILS # BLD AUTO: 2.9 10E3/UL (ref 1.6–8.3)
NEUTROPHILS NFR BLD AUTO: 44 %
NONHDLC SERPL-MCNC: 157 MG/DL
PLATELET # BLD AUTO: 351 10E3/UL (ref 150–450)
POTASSIUM BLD-SCNC: 4.8 MMOL/L (ref 3.4–5.3)
PROT SERPL-MCNC: 8 G/DL (ref 6.8–8.8)
RBC # BLD AUTO: 4.65 10E6/UL (ref 3.8–5.2)
SODIUM SERPL-SCNC: 137 MMOL/L (ref 133–144)
TIBC SERPL-MCNC: 363 UG/DL (ref 240–430)
TRIGL SERPL-MCNC: 294 MG/DL
WBC # BLD AUTO: 6.6 10E3/UL (ref 4–11)

## 2021-08-23 PROCEDURE — 36415 COLL VENOUS BLD VENIPUNCTURE: CPT | Performed by: NURSE PRACTITIONER

## 2021-08-23 PROCEDURE — 83550 IRON BINDING TEST: CPT | Performed by: NURSE PRACTITIONER

## 2021-08-23 PROCEDURE — 87389 HIV-1 AG W/HIV-1&-2 AB AG IA: CPT | Performed by: NURSE PRACTITIONER

## 2021-08-23 PROCEDURE — 99396 PREV VISIT EST AGE 40-64: CPT | Performed by: NURSE PRACTITIONER

## 2021-08-23 PROCEDURE — 80061 LIPID PANEL: CPT | Performed by: NURSE PRACTITIONER

## 2021-08-23 PROCEDURE — 99214 OFFICE O/P EST MOD 30 MIN: CPT | Mod: 25 | Performed by: NURSE PRACTITIONER

## 2021-08-23 PROCEDURE — 85025 COMPLETE CBC W/AUTO DIFF WBC: CPT | Performed by: NURSE PRACTITIONER

## 2021-08-23 PROCEDURE — 80053 COMPREHEN METABOLIC PANEL: CPT | Performed by: NURSE PRACTITIONER

## 2021-08-23 RX ORDER — LOSARTAN POTASSIUM 25 MG/1
25 TABLET ORAL DAILY
Qty: 90 TABLET | Refills: 0 | Status: SHIPPED | OUTPATIENT
Start: 2021-08-23 | End: 2022-02-01

## 2021-08-23 RX ORDER — ATORVASTATIN CALCIUM 10 MG/1
10 TABLET, FILM COATED ORAL DAILY
Qty: 90 TABLET | Refills: 3 | Status: SHIPPED | OUTPATIENT
Start: 2021-08-23 | End: 2022-09-21

## 2021-08-23 RX ORDER — CYCLOBENZAPRINE HCL 5 MG
5-10 TABLET ORAL 3 TIMES DAILY PRN
Qty: 90 TABLET | Refills: 0 | Status: SHIPPED | OUTPATIENT
Start: 2021-08-23 | End: 2022-06-02

## 2021-08-23 RX ORDER — OMEGA-3 FATTY ACIDS CAP DELAYED RELEASE 1000 MG 1000 MG
4 CAPSULE DELAYED RELEASE ORAL DAILY
Qty: 360 CAPSULE | Refills: 3 | Status: SHIPPED | OUTPATIENT
Start: 2021-08-23 | End: 2022-05-18

## 2021-08-23 RX ORDER — FLUOXETINE 10 MG/1
10 CAPSULE ORAL DAILY
Qty: 90 CAPSULE | Refills: 1 | Status: SHIPPED | OUTPATIENT
Start: 2021-08-23 | End: 2022-03-21

## 2021-08-23 ASSESSMENT — ASTHMA QUESTIONNAIRES
QUESTION_1 LAST FOUR WEEKS HOW MUCH OF THE TIME DID YOUR ASTHMA KEEP YOU FROM GETTING AS MUCH DONE AT WORK, SCHOOL OR AT HOME: SOME OF THE TIME
QUESTION_5 LAST FOUR WEEKS HOW WOULD YOU RATE YOUR ASTHMA CONTROL: SOMEWHAT CONTROLLED
QUESTION_4 LAST FOUR WEEKS HOW OFTEN HAVE YOU USED YOUR RESCUE INHALER OR NEBULIZER MEDICATION (SUCH AS ALBUTEROL): TWO OR THREE TIMES PER WEEK
QUESTION_2 LAST FOUR WEEKS HOW OFTEN HAVE YOU HAD SHORTNESS OF BREATH: MORE THAN ONCE A DAY
ACT_TOTALSCORE: 15
QUESTION_3 LAST FOUR WEEKS HOW OFTEN DID YOUR ASTHMA SYMPTOMS (WHEEZING, COUGHING, SHORTNESS OF BREATH, CHEST TIGHTNESS OR PAIN) WAKE YOU UP AT NIGHT OR EARLIER THAN USUAL IN THE MORNING: NOT AT ALL

## 2021-08-23 ASSESSMENT — ENCOUNTER SYMPTOMS
MYALGIAS: 0
CONSTIPATION: 0
HEMATURIA: 0
COUGH: 0
DYSURIA: 0
NERVOUS/ANXIOUS: 0
WEAKNESS: 0
JOINT SWELLING: 0
PALPITATIONS: 0
HEMATOCHEZIA: 0
ARTHRALGIAS: 1
DIARRHEA: 0
SHORTNESS OF BREATH: 1
FREQUENCY: 0
NAUSEA: 0
HEARTBURN: 0
PARESTHESIAS: 0
DIZZINESS: 0
BREAST MASS: 0
EYE PAIN: 0
FEVER: 0
HEADACHES: 1
ABDOMINAL PAIN: 0
CHILLS: 0
SORE THROAT: 0

## 2021-08-23 ASSESSMENT — MIFFLIN-ST. JEOR: SCORE: 1289.82

## 2021-08-23 ASSESSMENT — PAIN SCALES - GENERAL: PAINLEVEL: MODERATE PAIN (5)

## 2021-08-23 NOTE — PROGRESS NOTES
SUBJECTIVE:   CC: Carolyn Saldivar is an 61 year old woman who presents for preventive health visit.       Patient has been advised of split billing requirements and indicates understanding: Yes     Healthy Habits:     Getting at least 3 servings of Calcium per day:  NO    Bi-annual eye exam:  Yes    Dental care twice a year:  NO    Sleep apnea or symptoms of sleep apnea:  Sleep apnea    Diet:  Regular (no restrictions)    Frequency of exercise:  None    Taking medications regularly:  Yes    Medication side effects:  Muscle aches    PHQ-2 Total Score: 2    Additional concerns today:  Yes      Headaches, daily   She had it a year ago last spring and thought it was stress  She has it the minute she gets up until she goes to bed.   She is not stressed, so not sure why.   Drinks a lot of water, nothing else   is sober and her son is sober as well and mother no longer living with them so not sure.     No neck pain.     Neck surgery years ago.  herniated disk cervical no myelopathy. Did not have this in the past.   Displacement of cervical intervertebral disc   MRI cervical spine-  [facility] In Care-everywhere     Today's PHQ-2 Score:   PHQ-2 (  [employer]) 2021   Q1: Little interest or pleasure in doing things 1   Q2: Feeling down, depressed or hopeless 1   PHQ-2 Score 2   Q1: Little interest or pleasure in doing things Several days   Q2: Feeling down, depressed or hopeless Several days   PHQ-2 Score 2       Abuse: Current or Past (Physical, Sexual or Emotional) - No  Do you feel safe in your environment? Yes        Social History     Tobacco Use     Smoking status: Former Smoker     Packs/day: 0.50     Years: 15.00     Pack years: 7.50     Types: Cigarettes     Start date:      Quit date:      Years since quittin.6     Smokeless tobacco: Never Used   Substance Use Topics     Alcohol use: Yes     Comment: occasionally         Alcohol Use  8/23/2021   Prescreen: >3 drinks/day or >7 drinks/week? No   Prescreen: >3 drinks/day or >7 drinks/week? -       Reviewed orders with patient.  Reviewed health maintenance and updated orders accordingly - Yes  Lab work is in process    Breast Cancer Screening:  Any new diagnosis of family breast, ovarian, or bowel cancer? No    FHS-7: No flowsheet data found.    Mammogram Screening: Recommended mammography every 1-2 years with patient discussion and risk factor consideration  Pertinent mammograms are reviewed under the imaging tab.    History of abnormal Pap smear: NO - age 30-65 PAP every 5 years with negative HPV co-testing recommended  PAP / HPV Latest Ref Rng & Units 8/10/2020   PAP (Historical) - NIL   HPV16 NEG:Negative Negative   HPV18 NEG:Negative Negative   HRHPV NEG:Negative Negative     Reviewed and updated as needed this visit by clinical staff  Tobacco  Allergies  Meds   Med Hx  Surg Hx  Fam Hx  Soc Hx        Reviewed and updated as needed this visit by Provider                Past Medical History:   Diagnosis Date     Anxiety      Depressive disorder      Hypertension      Sleep apnea       Past Surgical History:   Procedure Laterality Date     C CERV SPINE FUSN,ANTER,BELOW C2  06/08/2008    [facility]     COLONOSCOPY  05/18/2010     COLONOSCOPY N/A 5/25/2021    Procedure: COLONOSCOPY, WITH POLYPECTOMY;  Surgeon: Ruby Otero DO;  Location: [facility]       Review of Systems   Constitutional: Negative for chills and fever.   HENT: Negative for congestion, ear pain, hearing loss and sore throat.    Eyes: Negative for pain and visual disturbance.   Respiratory: Positive for shortness of breath. Negative for cough.    Cardiovascular: Negative for chest pain, palpitations and peripheral edema.   Gastrointestinal: Negative for abdominal pain, constipation, diarrhea, heartburn, hematochezia and nausea.   Breasts:  Negative for tenderness, breast mass and discharge.   Genitourinary: Negative for  "dysuria, frequency, genital sores, hematuria, pelvic pain, urgency, vaginal bleeding and vaginal discharge.   Musculoskeletal: Positive for arthralgias. Negative for joint swelling and myalgias.   Neurological: Positive for headaches. Negative for dizziness, weakness and paresthesias.   Psychiatric/Behavioral: Positive for mood changes. The patient is not nervous/anxious.         OBJECTIVE:   /84   Pulse 78   Temp 97.5  F (36.4  C) (Temporal)   Resp 18   Ht 1.569 m (5' 1.77\")   Wt 78 kg (172 lb)   LMP  (LMP Unknown)   SpO2 98%   BMI 31.69 kg/m    Physical Exam  GENERAL: healthy, alert and no distress  EYES: Eyes grossly normal to inspection, PERRL and conjunctivae and sclerae normal  HENT: ear canals and TM's normal, nose and mouth without ulcers or lesions  NECK: no adenopathy, no asymmetry, masses, or scars and thyroid normal to palpation  RESP: lungs clear to auscultation - no rales, rhonchi or wheezes  BREAST: normal without masses, tenderness or nipple discharge and no palpable axillary masses or adenopathy  CV: regular rate and rhythm, normal S1 S2, no S3 or S4, no murmur, click or rub, no peripheral edema and peripheral pulses strong  ABDOMEN: soft, nontender, no hepatosplenomegaly, no masses and bowel sounds normal  MS: no gross musculoskeletal defects noted, no edema  SKIN: no suspicious lesions or rashes  NEURO: Normal strength and tone, mentation intact and speech normal  PSYCH: mentation appears normal, affect normal/bright    Diagnostic Test Results:  Labs reviewed in Epic  Results for orders placed or performed in visit on 08/23/21 (from the past 24 hour(s))   CBC with platelets and differential    Narrative    The following orders were created for panel order CBC with platelets and differential.  Procedure                               Abnormality         Status                     ---------                               -----------         ------                     CBC with platelets " and gabbie.[[id number]]                      Final result                 Please view results for these tests on the individual orders.   CBC with platelets and differential   Result Value Ref Range    WBC Count 6.6 4.0 - 11.0 10e3/uL    RBC Count 4.65 3.80 - 5.20 10e6/uL    Hemoglobin 13.6 11.7 - 15.7 g/dL    Hematocrit 40.6 35.0 - 47.0 %    MCV 87 78 - 100 fL    MCH 29.2 26.5 - 33.0 pg    MCHC 33.5 31.5 - 36.5 g/dL    RDW 13.4 10.0 - 15.0 %    Platelet Count 351 150 - 450 10e3/uL    % Neutrophils 44 %    % Lymphocytes 44 %    % Monocytes 8 %    % Eosinophils 4 %    % Basophils 1 %    Absolute Neutrophils 2.9 1.6 - 8.3 10e3/uL    Absolute Lymphocytes 2.9 0.8 - 5.3 10e3/uL    Absolute Monocytes 0.5 0.0 - 1.3 10e3/uL    Absolute Eosinophils 0.3 0.0 - 0.7 10e3/uL    Absolute Basophils 0.0 0.0 - 0.2 10e3/uL       ASSESSMENT/PLAN:   (Z00.00) Encounter for routine adult health examination without abnormal findings  (primary encounter diagnosis)  Comment: - Updated    Plan: Follow up for physical in 1 year.       (I10) Benign essential hypertension  Comment:   Blood pressure stable on current plan   Plan: Comprehensive metabolic panel (BMP + Alb, Alk         Phos, ALT, AST, Total. Bili, TP), losartan         (COZAAR) 25 MG tablet        Labs today     (F41.1) Generalized anxiety disorder  Comment:   Plan: FLUoxetine (PROZAC) 10 MG capsule        Stable with Prozac     (F32.1) Moderate major depression (H)  Comment:   Plan: FLUoxetine (PROZAC) 10 MG capsule        Stable with Prozac     (E78.1) Hypertriglyceridemia  Comment: Continues on Lipitor without side effects.   Plan: Lipid panel reflex to direct LDL Fasting,         Comprehensive metabolic panel (BMP + Alb, Alk         Phos, ALT, AST, Total. Bili, TP), atorvastatin         (LIPITOR) 10 MG tablet, Omega-3 Fatty Acids         (FISH OIL-OMEGA-3 FATTY ACID) 1000 MG DR         capsule        Labs today   Continue on Fish oil as well.     (E78.5) Hyperlipidemia LDL goal  <130  Comment:   Plan: Lipid panel reflex to direct LDL Fasting,         atorvastatin (LIPITOR) 10 MG tablet        - As noted above     (G47.33,  Z99.89) ROSA on CPAP  Comment:   Plan: Continue use of CPAP     (K57.30) Diverticulosis of colon  Comment:   Plan: Recommend avoiding constipation  Next Colonoscopy due in 2026.     (Z11.4) Screening for HIV (human immunodeficiency virus)  Comment: *  Plan: HIV Antigen Antibody Combo        Screening       (Z86.2) History of anemia  Comment:   Plan: CBC with platelets and differential, Iron and         iron binding capacity        Recheck labs today     (Z98.890) S/P cervical disc replacement  Comment: - As noted in HPI I am suspicious if her headaches are related to her cervical history. I would like to keep this in mind for future to see if we need to recheck her MRI. Reviewed history as noted in HPI and had cervical surgery back in 2008 for herniated disc.   Plan: Continue with evaluation of headache with the understanding that this may be contributing.     (G44.209) Tension headache  Comment: - Ongoing and worsening, given she has had worsening symptoms and no relief,we will proceed with a MRI. I feel that we have given this enough time to improve with other factors- reduction in stress, treatment of mental health for example.     Plan: MR Brain w/o & w Contrast, cyclobenzaprine         (FLEXERIL) 5 MG tablet        MRI scheduled  Flexeril as needed to see if this helps with headache tension if it is neck related. She does not have neck pain today. Neurological exam normal today  Headaches do not have light or sound sensitivity   No vision changes with this   Monitor blood pressures and no correlation during headaches.   Med review today as well to see if any medications could be causing this. No new supplements.   Would also recommend neurology consult.       Patient has been advised of split billing requirements and indicates understanding: Yes  COUNSELING:  Reviewed  "preventive health counseling, as reflected in patient instructions    Estimated body mass index is 31.69 kg/m  as calculated from the following:    Height as of this encounter: 1.569 m (5' 1.77\").    Weight as of this encounter: 78 kg (172 lb).    Weight management plan: Discussed healthy diet and exercise guidelines    She reports that she quit smoking about 31 years ago. Her smoking use included cigarettes. She started smoking about 51 years ago. She has a 7.50 pack-year smoking history. She has never used smokeless tobacco.      Counseling Resources:  ATP IV Guidelines  Pooled Cohorts Equation Calculator  Breast Cancer Risk Calculator  BRCA-Related Cancer Risk Assessment: FHS-7 Tool  FRAX Risk Assessment  ICSI Preventive Guidelines  Dietary Guidelines for Americans, 2010  USDA's MyPlate  ASA Prophylaxis  Lung CA Screening    SHERRELL Yu CNP  [facility]        A total of 45 minutes was spent with patient during visit discussing headaches, reviewing health history from outside facility for her back along with discussing diagnostic and treatment monitoring.   "

## 2021-08-24 LAB — HIV 1+2 AB+HIV1 P24 AG SERPL QL IA: NONREACTIVE

## 2021-08-24 ASSESSMENT — ASTHMA QUESTIONNAIRES: ACT_TOTALSCORE: 15

## 2021-08-26 ENCOUNTER — HOSPITAL ENCOUNTER (OUTPATIENT)
Dept: MRI IMAGING | Facility: CLINIC | Age: 61
Discharge: HOME | End: 2021-08-26
Attending: NURSE PRACTITIONER | Admitting: NURSE PRACTITIONER
Payer: COMMERCIAL

## 2021-08-26 DIAGNOSIS — G44.209 TENSION HEADACHE: ICD-10-CM

## 2021-08-26 PROCEDURE — 70553 MRI BRAIN STEM W/O & W/DYE: CPT

## 2021-08-26 PROCEDURE — 255N000002 HC RX 255 OP 636: Performed by: NURSE PRACTITIONER

## 2021-08-26 PROCEDURE — A9585 GADOBUTROL INJECTION: HCPCS | Performed by: NURSE PRACTITIONER

## 2021-08-26 RX ORDER — GADOBUTROL 604.72 MG/ML
7.5 INJECTION INTRAVENOUS ONCE
Status: COMPLETED | OUTPATIENT
Start: 2021-08-26 | End: 2021-08-26

## 2021-08-26 RX ADMIN — GADOBUTROL 7.5 ML: 604.72 INJECTION INTRAVENOUS at 14:05

## 2021-08-27 ENCOUNTER — TELEPHONE (OUTPATIENT)
Dept: FAMILY MEDICINE | Facility: OTHER | Age: 61
End: 2021-08-27

## 2021-08-27 ENCOUNTER — MYC MEDICAL ADVICE (OUTPATIENT)
Dept: FAMILY MEDICINE | Facility: OTHER | Age: 61
End: 2021-08-27

## 2021-08-27 DIAGNOSIS — G44.209 TENSION HEADACHE: Primary | ICD-10-CM

## 2021-08-28 ENCOUNTER — MYC MEDICAL ADVICE (OUTPATIENT)
Dept: FAMILY MEDICINE | Facility: OTHER | Age: 61
End: 2021-08-28

## 2021-08-28 DIAGNOSIS — G44.209 TENSION HEADACHE: ICD-10-CM

## 2021-08-28 DIAGNOSIS — Z98.890 HX OF CERVICAL SPINE SURGERY: Primary | ICD-10-CM

## 2021-08-28 DIAGNOSIS — R93.0 ABNORMAL MRI OF HEAD: ICD-10-CM

## 2021-08-30 ENCOUNTER — MYC MEDICAL ADVICE (OUTPATIENT)
Dept: FAMILY MEDICINE | Facility: OTHER | Age: 61
End: 2021-08-30

## 2021-08-30 DIAGNOSIS — J30.1 SEASONAL ALLERGIC RHINITIS DUE TO POLLEN: Primary | ICD-10-CM

## 2021-08-30 NOTE — TELEPHONE ENCOUNTER
Imaging will contact patient.       SHERRELL Yu CNP  Questions or concerns please feel free to send me a [employer] message or call me  Phone : [phone]

## 2021-08-30 NOTE — TELEPHONE ENCOUNTER
Please schedule patient for a cervical MRI.       SHERRELL Yu CNP  Questions or concerns please feel free to send me a [employer] message or call me  Phone : [phone]

## 2021-08-30 NOTE — TELEPHONE ENCOUNTER
Patient with a history of cervical surgery back in 2008.    ACD-ACF of C6-7, placement of a [employer] allograft for arthrodesis, an 8  x 11 x 14 - no plate required.      Having headaches for the past year, no specific neck pain. Brain MRI not convincing that this is the cause of her headaches although we will be seeing neurology for a second opinion on this. Would like cervical spinal MRI to evaluate for causes of her headaches as well.       SHERRELL Yu CNP  Questions or concerns please feel free to send me a [employer] message or call me  Phone : [phone]

## 2021-08-31 NOTE — TELEPHONE ENCOUNTER
Routing refill request to provider for review/approval because:  Medication is reported/historical  Lorena Min RN on 8/31/2021 at 7:24 AM

## 2021-08-31 NOTE — TELEPHONE ENCOUNTER
Refill given as we discussed at visit.       SHERRELL Yu CNP  Questions or concerns please feel free to send me a [employer] message or call me  Phone : [phone]

## 2021-09-02 ENCOUNTER — HOSPITAL ENCOUNTER (OUTPATIENT)
Dept: MRI IMAGING | Facility: CLINIC | Age: 61
Discharge: HOME | End: 2021-09-02
Attending: NURSE PRACTITIONER | Admitting: NURSE PRACTITIONER
Payer: COMMERCIAL

## 2021-09-02 DIAGNOSIS — R93.0 ABNORMAL MRI OF HEAD: ICD-10-CM

## 2021-09-02 DIAGNOSIS — G44.209 TENSION HEADACHE: ICD-10-CM

## 2021-09-02 DIAGNOSIS — Z98.890 HX OF CERVICAL SPINE SURGERY: ICD-10-CM

## 2021-09-02 PROCEDURE — 72141 MRI NECK SPINE W/O DYE: CPT

## 2021-09-03 ENCOUNTER — MYC MEDICAL ADVICE (OUTPATIENT)
Dept: FAMILY MEDICINE | Facility: OTHER | Age: 61
End: 2021-09-03

## 2021-09-03 DIAGNOSIS — Z98.890 HX OF CERVICAL SPINE SURGERY: Primary | ICD-10-CM

## 2021-09-03 DIAGNOSIS — G44.209 TENSION HEADACHE: ICD-10-CM

## 2021-09-14 ENCOUNTER — TRANSFERRED RECORDS (OUTPATIENT)
Dept: HEALTH INFORMATION MANAGEMENT | Facility: CLINIC | Age: 61
End: 2021-09-14
Payer: COMMERCIAL

## 2021-09-16 ENCOUNTER — OFFICE VISIT (OUTPATIENT)
Dept: NEUROSURGERY | Facility: CLINIC | Age: 61
End: 2021-09-16
Payer: COMMERCIAL

## 2021-09-16 VITALS
HEIGHT: 62 IN | WEIGHT: 174.9 LBS | TEMPERATURE: 98.2 F | SYSTOLIC BLOOD PRESSURE: 130 MMHG | BODY MASS INDEX: 32.18 KG/M2 | DIASTOLIC BLOOD PRESSURE: 80 MMHG

## 2021-09-16 DIAGNOSIS — M54.12 CERVICAL RADICULOPATHY: Primary | ICD-10-CM

## 2021-09-16 PROCEDURE — 99203 OFFICE O/P NEW LOW 30 MIN: CPT | Performed by: NURSE PRACTITIONER

## 2021-09-16 ASSESSMENT — PAIN SCALES - GENERAL: PAINLEVEL: SEVERE PAIN (6)

## 2021-09-16 ASSESSMENT — MIFFLIN-ST. JEOR: SCORE: 1301.83

## 2021-09-16 NOTE — PROGRESS NOTES
"Carolyn Saldivar is a 61 year old female who presents for:  No chief complaint on file.       Initial Vitals:  /80   Temp 98.2  F (36.8  C) (Temporal)   Ht 5' 1.7\" (1.567 m)   Wt 174 lb 14.4 oz (79.3 kg)   LMP  (LMP Unknown)   BMI 32.30 kg/m   Estimated body mass index is 32.3 kg/m  as calculated from the following:    Height as of this encounter: 5' 1.7\" (1.567 m).    Weight as of this encounter: 174 lb 14.4 oz (79.3 kg).. Body surface area is 1.86 meters squared. BP completed using cuff size: regular  Severe Pain (6)    Nursing Comments:     Danielle Kapoor      "

## 2021-09-16 NOTE — LETTER
9/16/2021         RE: Carolyn Saldivar  [address]  [facility] [address]        Dear Colleague,    Thank you for referring your patient, Carolyn Saldivar, to the [facility]UROS[facility] [place]. Please see a copy of my visit note below.    [facility]  Neurosurgery Clinic Visit      CC: neck and shoulder pain    Primary care Provider: Ronda Mena    Reason For Visit:   I was asked by Ronda Mena CNP to consult on the patient for hx of cervical spine surgery, tension headache.    HPI: Carolyn Saldivar is a 61 year old female with a history of C6-7 ACDF with new symptoms. She describes neck and bilateral shoulder pain that causes her headaches. She denies paresthesias and weakness. No bowel/bladder complaints or foot drop. She has undergone a cervical MRI. She has not had any recent injections or therapy.     Past Medical History:   Diagnosis Date     Anxiety      Depressive disorder      Hypertension      Sleep apnea        Past Medical History reviewed with patient during visit.    Past Surgical History:   Procedure Laterality Date     C CERV SPINE FUSN,ANTER,BELOW C2  06/08/2008    [facility]     COLONOSCOPY  05/18/2010     COLONOSCOPY N/A 5/25/2021    Procedure: COLONOSCOPY, WITH POLYPECTOMY;  Surgeon: Ruby Otero DO;  Location: [facility] OR     Past Surgical History reviewed with patient during visit.    Current Outpatient Medications   Medication     albuterol (PROAIR HFA) 108 (90 Base) MCG/ACT inhaler     atorvastatin (LIPITOR) 10 MG tablet     cyclobenzaprine (FLEXERIL) 5 MG tablet     FLUoxetine (PROZAC) 10 MG capsule     fluticasone (FLONASE) 50 MCG/ACT nasal spray     fluticasone (FLOVENT HFA) 110 MCG/ACT inhaler     loratadine-pseudoePHEDrine (CLARITIN-D 24-HOUR)  MG 24 hr tablet     losartan (COZAAR) 25 MG tablet     Omega-3 Fatty Acids (FISH OIL-OMEGA-3 FATTY ACID) 1000 MG DR capsule     No current facility-administered medications for  this visit.       Allergies   Allergen Reactions     Cats      Dogs      Seasonal Allergies Other (See Comments)     Birch trees, maple trees, aramis grass, orchard grass, mugwart, lamb's quarter, dust mites       Social History     Socioeconomic History     Marital status:      Spouse name: Not on file     Number of children: Not on file     Years of education: Not on file     Highest education level: Not on file   Occupational History     Not on file   Tobacco Use     Smoking status: Former Smoker     Packs/day: 0.50     Years: 15.00     Pack years: 7.50     Types: Cigarettes     Start date:      Quit date:      Years since quittin.7     Smokeless tobacco: Never Used   Vaping Use     Vaping Use: Never used   Substance and Sexual Activity     Alcohol use: Yes     Comment: occasionally     Drug use: Never     Sexual activity: Not Currently   Other Topics Concern     Not on file   Social History Narrative     Not on file     Social Determinants of Health     Financial Resource Strain:      Difficulty of Paying Living Expenses:    Food Insecurity:      Worried About Running Out of Food in the Last Year:      Ran Out of Food in the Last Year:    Transportation Needs:      Lack of Transportation (Medical):      Lack of Transportation (Non-Medical):    Physical Activity:      Days of Exercise per Week:      Minutes of Exercise per Session:    Stress:      Feeling of Stress :    Social Connections:      Frequency of Communication with Friends and Family:      Frequency of Social Gatherings with Friends and Family:      Attends Tenri[employer] Services:      Active Member of Clubs or Organizations:      Attends Club or Organization Meetings:      Marital Status:    Intimate Partner Violence:      Fear of Current or Ex-Partner:      Emotionally Abused:      Physically Abused:      Sexually Abused:        Family History   Problem Relation Age of Onset     Heart Failure Mother          ROS: 10 point ROS neg other  than the symptoms noted above in the HPI.    Vital Signs:   LMP  (LMP Unknown)       Examination:  Constitutional:  Alert, well nourished, NAD.  Memory: recent and remote memory   HEENT: Normocephalic, atraumatic.   Pulm:  Without shortness of breath   CV:  No pitting edema of BLE.      Neurological:  Awake  Alert  Oriented x 3  Speech clear  Tongue midline    Motor exam:  Shoulder Abduction:  Right:  5/5   Left:  5/5  Biceps:                      Right:  5/5   Left:  5/5  Triceps:                     Right:  5/5   Left:  5/5  Wrist Extensors:       Right:  5/5   Left:  5/5  Wrist Flexors:           Right:  5/5   Left:  5/5  Intrinsics:                   Right:  5/5   Left:  5/5    Sensation normal to bilateral upper and lower extremities  Muscle tone to bilateral upper and lower extremities   Gait: Able to stand from a seated position. Normal non-antalgic, non-myelopathic gait.  Able to heel/toe walk without loss of balance    Cervical examination reveals good range of motion.  No tenderness to palpation of the cervical spine or paraspinous muscles bilaterally.    Imaging:   Cervical MRI 9/2/2021  IMPRESSION:    1. Solid bony fusion across the C6-C7 disc space with probable disc spacer at that level. No spinal canal or neural foraminal narrowing at the postoperative level.  2. Degenerative changes in the cervical spine, most pronounced at C4-C5 and C5-C6 as detailed above.  3. Mild right neural foraminal narrowing at C5-C6. No other significant spinal canal or neural foraminal narrowings.     Assessment/Plan:   Cervical radiculopathy. Plan for PT and DAYANA at this time. She will contact the clinic with new or worsening symptoms. She verbalized understanding and agreement.    Patient Instructions   -Cervical injection ordered. They will contact you to schedule.  -Physical therapy ordered. They will contact you to schedule.  -Please contact our clinic with questions or concerns at [phone].      Megan BRIONES  VISHNU Long  [facility]  [address] [place] [id number]  Tel [phone]  Fax [phone]        Again, thank you for allowing me to participate in the care of your patient.        Sincerely,        Megan Long NP

## 2021-09-16 NOTE — PATIENT INSTRUCTIONS
-Cervical injection ordered. They will contact you to schedule.  -Physical therapy ordered. They will contact you to schedule.  -Please contact our clinic with questions or concerns at [phone].

## 2021-09-17 ENCOUNTER — TELEPHONE (OUTPATIENT)
Dept: PALLIATIVE MEDICINE | Facility: CLINIC | Age: 61
End: 2021-09-17

## 2021-09-17 NOTE — TELEPHONE ENCOUNTER
Called pt to schedule MIRTA, 1st opening is October 14th.  Pt states she needs to have this done sooner.  Referred patient to call [facility] to be referred to another facility.

## 2021-09-19 ENCOUNTER — MYC MEDICAL ADVICE (OUTPATIENT)
Dept: NEUROSURGERY | Facility: CLINIC | Age: 61
End: 2021-09-19

## 2021-09-19 DIAGNOSIS — M54.12 CERVICAL RADICULOPATHY: Primary | ICD-10-CM

## 2021-09-20 ENCOUNTER — TELEPHONE (OUTPATIENT)
Dept: FAMILY MEDICINE | Facility: OTHER | Age: 61
End: 2021-09-20

## 2021-09-20 NOTE — TELEPHONE ENCOUNTER
Will send to provider to advise.   Patient wanting cortisone shot ? Neck?  Looks like should have been pain clinic.  Please send to scheduling if incorrect.

## 2021-09-20 NOTE — TELEPHONE ENCOUNTER
Reason for Call:  Other appointment    Detailed comments: Made appointment for patient today for a cortisone shot. I wanted to make sure this was ok.Patient would like to get in as soon as possible      Phone Number Patient can be reached at: Cell number on file:    Telephone Information:   Mobile [phone]       Best Time: anytime    Can we leave a detailed message on this number? no    Call taken on 9/20/2021 at 9:39 AM by Suzie Lombardo

## 2021-09-20 NOTE — TELEPHONE ENCOUNTER
Yes, I do not do those.  Please call patient and schedule with pain clinic.       Lily Rivas PA-C

## 2021-09-20 NOTE — TELEPHONE ENCOUNTER
Left message for pt to re-schedule the 09/22/21 appt with Lily Rivas.  Pain clinic # given. Phone: [phone]. Appt has been cancelled in [facility].

## 2021-09-21 ENCOUNTER — TELEPHONE (OUTPATIENT)
Dept: PALLIATIVE MEDICINE | Facility: CLINIC | Age: 61
End: 2021-09-21

## 2021-09-21 NOTE — TELEPHONE ENCOUNTER
Screening Questions for Radiology Injections:    Injection to be done at which interventional clinic site? [facility]    If [facility] location, tell patient that this procedure requires a COVID-19 lab test be done within 4 days of the procedure. Would you still like to move forward with scheduling the procedure?  Not Applicable   If YES, let patient know that someone will call them to schedule the COVID-19 test and that they will only receive a call back if the result is positive. Route to nursing to enter order.     Instruct patient to arrive as directed prior to the scheduled appointment time:    [place] minutes before      [place]: 30 minutes before; if IV needed 1 hour before     Procedure ordered by Ethan    Procedure ordered? C7-T1 MIRTA      Transforaminal Cervical MIRTA -  [place] does NOT have providers that do these- [facility] and [facility] do have providers that do    As a reminder, receiving steroids can decrease your body's ability to fight infection.   Would you still like to move forward with scheduling the injection?  Yes    What insurance would patient like us to bill for this procedure? [employer]      Worker's comp or MVA (motor vehicle accident) -Any injection DO NOT SCHEDULE and route to Sharon Mendoza.      [employer] insurance - For SI joint injections, DO NOT SCHEDULE and route Sharon Mendoza.       ALL BCBS, [employer] and [employer]-Route to Sharon for review DO NOT SCHEDULE      IF SCHEDULING IN [place] AND NEEDS A PA, IT IS OKAY TO SCHEDULE. [place] HANDLES THEIR OWN PA'S AFTER THE PATIENT IS SCHEDULED. PLEASE SCHEDULE AT LEAST 1 WEEK OUT SO A PA CAN BE OBTAINED.    Any chance of pregnancy? NO   If YES, do NOT schedule and route to RN pool    Is an  needed? No     Patient has a drive home? (mandatory) YES: INFORMED    Is patient taking any blood thinners (i.e. plavix, coumadin, jantoven, warfarin, heparin, pradaxa or dabigatran, etc)? No   If  hold needed, do NOT schedule, route to RN pool     Is patient taking any aspirin products (includes Excedrin and Fiorinal)? No     If more than 325mg/day, OK to schedule; Instruct pt to decrease to less than 325 mg for 7 days AND route to RN pool    For CERVICAL procedures, hold all aspirin products for 6 days.     Tell pt that if aspirin product is not held for 6 days, the procedure WILL BE cancelled.      Does the patient have a bleeding or clotting disorder? No     If YES, okay to schedule AND route to RN nurse pool    For any patients with platelet count <100, must be forwarded to provider    Any allergies to contrast dye, iodine, shellfish, or numbing and steroid medications? No    If YES, add allergy information to appointment notes AND route to the RN pool     If MIRTA and Contrast Dye / Iodine Allergy? DO NOT SCHEDULE, route to RN pool    Allergies: Cats, Dogs, and Seasonal allergies     Is patient diabetic?  No  If YES, instruct them to bring their glucometer.    Does patient have an active infection or treated for one within the past week? No     Is patient currently taking any antibiotics?  No     For patients on chronic, preventative, or prophylactic antibiotics, procedures may be scheduled.     For patients on antibiotics for active or recent infection:antibiotic course must have been completed for 4 days    Is patient currently taking any steroid medications? (i.e. Prednisone, Medrol)  No     For patients on steroid medications, course must have been completed for 4 days    Is patient actively being treated for cancer or immunocompromised? No  If YES, do NOT schedule and route to RN pool     Are you able to get on and off an exam table with minimal or no assistance? Yes  If NO, do NOT schedule and route to RN pool    Are you able to roll over and lay on your stomach with minimal or no assistance? Yes  If NO, do NOT schedule and route to RN pool     Has the patient had a flu shot or any other vaccinations  within 7 days before or after the procedure.  No     Have you recently had a COVID vaccine or have plans to get it in the near future? No    If yes, explain that for the vaccine to work best they need to:       wait 1 week before and 1 week after getting Vaccine #1    wait 1 week before and 2 weeks after getting Vaccine #2    If patient has concerns about the timing, send to RN pool     Does patient have an MRI/CT?  YES: 2021  Check Procedure Scheduling Grid to see if required.      Was the MRI done within the last 3 years?  Yes    If yes, where was the MRI done i.e.[facility], [facility], [facility], [facility] etc? [facility]      If no, do not schedule and route to RN pool    If MRI was not done at [place], [facility] or [facility] do NOT schedule and route to RN pool.      If pt has an imaging disc, the injection MAY be scheduled but pt has to bring disc to appt.     If they show up without the disc the injection cannot be done    Procedure Specific Instructions:      If celiac plexus block, informed patient NPO for 6 hours and that it is okay to take medications with sips of water, especially blood pressure medications  Not Applicable         If this is for a cervical procedure, informed patient that aspirin needs to be held for 6 days.   Not Applicable      If IV needed:    Do not schedule procedures requiring IV placement in the first appointment of the day or first appointment after lunch. Do NOT schedule at 0745, 0815 or 1245.     Instructed pt to arrive 30 minutes early for IV start if required. (Check Procedure Scheduling Grid)  YES:     Reminders:      If you are started on any steroids or antibiotics between now and your appointment, you must contact us because the procedure may need to be cancelled.  Yes      For all procedures except radiofrequency ablations (RFAs) and spinal cord stimulator (SCS) trials, informed patient:    IV sedation is not provided for this procedure.  If you feel that an oral  anti-anxiety medication is needed, you can discuss this further with your referring provider or primary care provider.  The Pain Clinic provider will discuss specifics of what the procedure includes at your appointment.  Most procedures last 10-20 minutes.  We use numbing medications to help with any discomfort during the procedure.  Not Applicable      For patients 85 or older we recommend having an adult stay w/ them for the remainder of the day.       Does the patient have any questions?  NO  Kathi Smith  [facility]

## 2021-09-28 ENCOUNTER — RADIOLOGY INJECTION OFFICE VISIT (OUTPATIENT)
Dept: PALLIATIVE MEDICINE | Facility: CLINIC | Age: 61
End: 2021-09-28
Payer: COMMERCIAL

## 2021-09-28 VITALS — DIASTOLIC BLOOD PRESSURE: 95 MMHG | OXYGEN SATURATION: 99 % | SYSTOLIC BLOOD PRESSURE: 155 MMHG | HEART RATE: 75 BPM

## 2021-09-28 DIAGNOSIS — M54.12 CERVICAL RADICULOPATHY: ICD-10-CM

## 2021-09-28 PROCEDURE — 62321 NJX INTERLAMINAR CRV/THRC: CPT | Performed by: PAIN MEDICINE

## 2021-09-28 RX ORDER — DEXAMETHASONE SODIUM PHOSPHATE 10 MG/ML
10 INJECTION INTRAMUSCULAR; INTRAVENOUS ONCE
Status: COMPLETED | OUTPATIENT
Start: 2021-09-28 | End: 2021-09-28

## 2021-09-28 RX ADMIN — DEXAMETHASONE SODIUM PHOSPHATE 10 MG: 10 INJECTION INTRAMUSCULAR; INTRAVENOUS at 11:58

## 2021-09-28 ASSESSMENT — PAIN SCALES - GENERAL: PAINLEVEL: MILD PAIN (3)

## 2021-09-28 NOTE — PROGRESS NOTES
[facility] - Procedure Note    Date of Visit: 9/28/2021    Procedure performed: C7-T1 interlaminar epidural steroid injection with fluoroscopic guidance  Diagnosis: Cervical spondylosis; Cervical radiculitis/radiculopathy  : Easton Schmidt MD  Anesthesia: none    Indications: Carolyn Saldivar is a 61 year old female who is seen  for cervical epidural steroid injection. The patient describes bilateral neck pain radiating to her UE. The patient has been exhibiting symptoms consistent with cervical intraspinal inflammation and radiculopathy. Symptoms have been persistent, disabling, and intermittently severe. The patient reports minimal improvement with conservative treatment, including meds/pt/surgery.    Cervical MRI   Level by level as follows:      C2-C3: No loss of disc height. No significant disc herniation. Mild  left facet hypertrophy. No spinal canal or neural foraminal narrowing.        C3-C4: No loss of disc height. No significant disc herniation. Mild  facet hypertrophy. No spinal canal or neural foraminal narrowing.      C4-C5: No significant loss of disc height. Central disc protrusion  which abuts the ventral spinal cord. Normal facets. No significant  spinal canal narrowing. No neural foraminal narrowing.      C5-C6: Mild loss of disc height. Circumferential disc bulge with right  greater than left uncinate spurring. Normal facets. Disc bulge abuts  and indents the ventral spinal cord. No significant spinal canal  narrowing. Mild right neural foraminal narrowing. No left neural  foraminal narrowing.      C6-C7: Solid bony fusion across the disc space. Normal facets. No  spinal canal or neural foraminal narrowing.      C7-T1: No loss of disc height. No significant disc herniation. Normal  facets. No spinal canal or neural foraminal narrowing.      Probable mucosal thickening in the only partially visualized sphenoid  sinuses.                                                                       IMPRESSION:    1. Solid bony fusion across the C6-C7 disc space with probable disc  spacer at that level. No spinal canal or neural foraminal narrowing at  the postoperative level.  2. Degenerative changes in the cervical spine, most pronounced at  C4-C5 and C5-C6 as detailed above.  3. Mild right neural foraminal narrowing at C5-C6. No other  significant spinal canal or neural foraminal narrowings.         Allergies:      Allergies   Allergen Reactions     Cats      Dogs      Seasonal Allergies Other (See Comments)     Birch trees, maple trees, aramis grass, orchard grass, mugwart, lamb's quarter, dust mites        Vitals:  BP (!) 155/95   Pulse 75   LMP  (LMP Unknown)   SpO2 99%     Review of Systems: The patient denies recent fever, chills, illness, use of antibiotics or anticoagulants. All other 10-point review of systems negative.     Procedure: The procedure and risks were explained, and informed written consent was obtained from the patient. Risks include but are not limited to: infection, bleeding, increased pain, and damage to soft tissue, nerve, muscle, and vasculature structures. After getting informed consent, patient was brought into the procedure suite and was placed in a prone position on the procedure table. A Pause for the Cause was performed. Patient was prepped and draped in sterile fashion.     The C7-T1 interspace was identified with use of fluoroscopy in AP view. A 25-gauge, 1.5 inch needle was used to anesthetize the skin and subcutaneous tissue entry site with a total of 2 ml of 1% lidocaine. Under fluoroscopic visualization, a 22-gauge, 3.5 inch Tuohy epidural needle was slowly advanced towards the epidural space a few millimeters midline of midline. The latter part of the needle advancement was guided with fluoroscopy in the lateral view. The epidural space was identified using loss of resistance technique. After negative aspiration for heme and cerebrospinal fluid, a  total of 1 mL of Omnipaque was injected to confirm needle placement. 9 mL of contrast was wasted. Epidurogram confirmed spread within the posterior epidural space. 2 ml of 10mg/ml of dexamethasone and 1 ml of preservative free 0.25% bupivacaine was injected. The needle was removed.  Images were saved to PACS.    The patient tolerated the procedure well, and there was no evidence of procedural complications. No new sensory or motor deficits were noted following the procedure. The patient was stable and able to ambulate on discharge home. Post-procedure instructions were provided.     Pre-procedure pain score: 3/10 in the neck, 3/10 in the arm  Post-procedure pain score: 3/10 in the neck, 3/10 in the arm    Assessment/Plan: Carolyn Saldivar is a 61 year old female s/p cervical interlaminar epidural steroid injection today for cervical spondylosis and radiculitis/radiculopathy.     1. Following today's procedure, the patient was advised to contact the [facility] for any of the following:   Fever, chills, or night sweats   New onset of pain, numbness, or weakness   Any questions/concerns regarding the procedure  If unable to contact the Pain Center, the patient was instructed to go to a local Emergency Room for any complications.   2. The patient will receive a follow-up call in 1 week.   3. Follow-up with referring provider in 2 weeks for post-procedure evaluation.    Easton Schmidt MD   Pain Management    [facility]

## 2021-09-28 NOTE — NURSING NOTE
Discharge Information    IV Discontiued Time:  NA    Amount of Fluid Infused:  NA    Discharge Criteria = When patient returns to baseline or as per MD order    Consciousness:  Pt is fully awake    Circulation:  BP +/- 20% of pre-procedure level    Respiration:  Patient is able to breathe deeply    O2 Sat:  Patient is able to maintain O2 Sat >92% on room air    Activity:  Moves 4 extremities on command    Ambulation:  Patient is able to stand and walk or stand and pivot into wheelchair    Dressing:  Clean/dry or No Dressing    Notes:   Discharge instructions and AVS given to patient    Patient meets criteria for discharge?  YES    Admitted to PCU?  No    Responsible adult present to accompany patient home?  Yes    Signature/Title:    thomas cross RN  RN Care Coordinator  [facility] [place]

## 2021-09-28 NOTE — PATIENT INSTRUCTIONS
[facility]   Procedure Discharge Instructions    Today you saw:     Dr. Easton Schmidt      You had an:  Epidural steroid injection   -cervical      Medications used:  Lidocaine   Bupivacaine   Dexamethasone Omnipaque   Normal saline              Be cautious as numbness and/or weakness in the upper extremities may occur for up to 6-8 hours after the procedure due to effect of the local anesthetic    Do not drive for 6 hours. The effect of the local anesthetic could slow your reflexes.     You may resume your regular activities after 24 hours    Avoid strenuous activity for the first 24 hours    You may shower, however avoid swimming, tub baths or hot tubs for 24 hours following your procedure    You may have a mild to moderate increase in pain for several days following the injection.    It may take up to 14 days for the steroid medication to start working although you may feel the effect as early as a few days after the procedure.       You may use ice packs for 10-15 minutes, 3 to 4 times a day at the injection site for comfort    Do not use heat to painful areas for 6 to 8 hours. This will give the local anesthetic time to wear off and prevent you from accidentally burning your skin.     Unless you have been directed to avoid the use of anti-inflammatory medications (NSAIDS), you may use medications such as ibuprofen, Aleve or Tylenol for pain control if needed.     If you were fasting, you may resume your normal diet and medications after the procedure    If you have diabetes, check your blood sugar more frequently than usual as your blood sugar may be higher than normal for 10-14 days following a steroid injection. Contact your doctor who manages your diabetes if your blood sugar is higher than usual    Possible side effects of steroids that you may experience include flushing, elevated blood pressure, increased appetite, mild headaches and restlessness.  All of these symptoms  will get better with time.    If you experience any of the following, call the Pain Clinic during work hours (Mon-Friday 8-4:30 pm) at [phone] or the Provider Line after hours at [phone]:  -Fever over 100 degree F  -Swelling, bleeding, redness, drainage, warmth at the injection site  -Progressive weakness or numbness in your legs or arms  -Loss of bowel or bladder function  -Unusual headache that is not relieved by Tylenol or other pain reliever  -Unusual new onset of pain that is not improving

## 2021-09-28 NOTE — NURSING NOTE
Pre-procedure Intake    Have you been fasting? NA    If yes, for how long? NA    Are you taking a prescribed blood thinner such as coumadin, Plavix, Xarelto?    No    If yes, when did you take your last dose? NA    Do you take aspirin?  No    If cervical procedure, have you held aspirin for 6 days?   NA    Do you have any allergies to contrast dye, iodine, steroid and/or numbing medications?  NO    Are you currently taking antibiotics or have an active infection?  NO    Have you had a fever/elevated temperature within the past week? NO    Are you currently taking oral steroids? NO    Do you have a ? Yes       Are you pregnant or breastfeeding?  NO    Have you received the COVID-19 vaccine? Yes       If yes, was it your 1st, 2nd or only dose needed? Yes    Date of most recent vaccine: April 2021    Notify provider and RNs if systolic BP >170, diastolic BP >100, P >100 or O2 sats < 90%      Ekta Prieto CMA ([facility])

## 2021-10-10 ENCOUNTER — HEALTH MAINTENANCE LETTER (OUTPATIENT)
Age: 61
End: 2021-10-10

## 2021-10-19 PROBLEM — F32.9 MAJOR DEPRESSION: Status: ACTIVE | Noted: 2020-03-31

## 2021-12-03 ENCOUNTER — TELEPHONE (OUTPATIENT)
Dept: FAMILY MEDICINE | Facility: OTHER | Age: 61
End: 2021-12-03
Payer: COMMERCIAL

## 2021-12-03 NOTE — TELEPHONE ENCOUNTER
Patient states she woke up with a muscle cramp in her leg the other night and she states she then passed out, she said her arms were heavy and eyelids wouldn't open. She feels like she lost full consciousness, she says she hit her head. She woke to head sore and against the wall. Says she has a headache today, and she had been dealing with headaches. She got a cortisone injection back at the end of september that has helped up until now.   She wants to know Ronda if you recommend her coming in to be seen about this.  Ph [phone]

## 2021-12-03 NOTE — TELEPHONE ENCOUNTER
Contacted pt. Advised her of message from provider. She states that she is in [place] right now and asks if she should go in somewhere there? Advised that she should be seen due to the loss of consciousness. Pt verbalized understanding and is in agreement with plan.     Bethany Monahan, DEXTERN, RN, PHN  Registered Nurse-Clinic Triage  [facility]  12/3/2021 at 1:01 PM

## 2021-12-03 NOTE — TELEPHONE ENCOUNTER
I would definintely recommend she be seen and evaluated especially with LOC she should be evaluated in an ER and then follow up with me.       Ronda Mena, SHERRELL CNP  Questions or concerns please feel free to send me a [employer] message or call me  Phone : [phone]

## 2022-01-27 ENCOUNTER — MYC MEDICAL ADVICE (OUTPATIENT)
Dept: FAMILY MEDICINE | Facility: OTHER | Age: 62
End: 2022-01-27
Payer: COMMERCIAL

## 2022-01-28 NOTE — TELEPHONE ENCOUNTER
RN's please contact patient. Is she still in [place]? If so she will need to seek assistance there. As for the pills we are not able to prescribe them, but I can do a visit and determine if she meets criteria for me to place a referral and that goes to a pool of providers that call the patient to discuss her qualifications. Based on what she has I do see she may qualify, again depends on where she is at in her illness etc.       SHERRELL Yu CNP  Questions or concerns please feel free to send me a [employer] message or call me  Phone : [phone]

## 2022-01-31 DIAGNOSIS — I10 BENIGN ESSENTIAL HYPERTENSION: ICD-10-CM

## 2022-02-01 RX ORDER — LOSARTAN POTASSIUM 25 MG/1
25 TABLET ORAL DAILY
Qty: 90 TABLET | Refills: 0 | Status: SHIPPED | OUTPATIENT
Start: 2022-02-01 | End: 2022-04-21

## 2022-02-01 NOTE — TELEPHONE ENCOUNTER
Pending Prescriptions:                       Disp   Refills    losartan (COZAAR) 25 MG tablet             90 tab*0        Sig: Take 1 tablet (25 mg) by mouth daily    Routing refill request to provider for review/approval because:  Labs out of range:    BP Readings from Last 3 Encounters:   09/28/21 (!) 155/95   09/16/21 130/80   08/23/21 126/84     A break in medication    losartan (COZAAR) 25 MG tablet 90 tablet 0 8/23/2021  No   Sig - Route: Take 1 tablet (25 mg) by mouth daily - Oral   Sent to pharmacy as: Losartan Potassium 25 MG Oral Tablet (COZAAR)   Class: E-Prescribe   Order: [id number]   E-Prescribing Status: Receipt confirmed by pharmacy (8/23/2021 11:27 AM CDT)     Bethany Monahan RN on 2/1/2022 at 1:54 PM

## 2022-02-09 NOTE — TELEPHONE ENCOUNTER
RECORDS RECEIVED FROM: [facility] (Requested)   REASON FOR VISIT: Tension headache [G44.209],    Date of Appt: 02/23/2022   NOTES (FOR ALL VISITS) STATUS DETAILS   OFFICE NOTE from referring provider In process    OFFICE NOTE from other specialist In process    DISCHARGE SUMMARY from hospital In process    DISCHARGE REPORT from the ER In process    OPERATIVE REPORT In process    MEDICATION LIST In process    IMAGING  (FOR ALL VISITS)     EMG In process    EEG In process    LUMBAR PUNCTURE In process    JAKE SCAN In process    ULTRASOUND (CAROTID BILAT) *VASCULAR* In process    MRI (HEAD, NECK, SPINE) In process    CT (HEAD, NECK, SPINE) In process             Action    Action Taken Sent request to [facility] [phone]. MRV 02/09/2022     2nd request sent to [facility] [phone]. MRV 02/11/2022 12:59

## 2022-02-23 ENCOUNTER — HOSPITAL ENCOUNTER (OUTPATIENT)
Dept: GENERAL RADIOLOGY | Facility: CLINIC | Age: 62
End: 2022-02-23
Payer: COMMERCIAL

## 2022-02-23 ENCOUNTER — OFFICE VISIT (OUTPATIENT)
Dept: NEUROLOGY | Facility: CLINIC | Age: 62
End: 2022-02-23
Attending: NURSE PRACTITIONER
Payer: COMMERCIAL

## 2022-02-23 ENCOUNTER — OFFICE VISIT (OUTPATIENT)
Dept: PULMONOLOGY | Facility: CLINIC | Age: 62
End: 2022-02-23
Payer: COMMERCIAL

## 2022-02-23 ENCOUNTER — HOSPITAL ENCOUNTER (OUTPATIENT)
Dept: CARDIOLOGY | Facility: CLINIC | Age: 62
End: 2022-02-23
Payer: COMMERCIAL

## 2022-02-23 ENCOUNTER — PRE VISIT (OUTPATIENT)
Dept: NEUROLOGY | Facility: CLINIC | Age: 62
End: 2022-02-23

## 2022-02-23 VITALS
BODY MASS INDEX: 33.43 KG/M2 | DIASTOLIC BLOOD PRESSURE: 80 MMHG | SYSTOLIC BLOOD PRESSURE: 141 MMHG | WEIGHT: 181 LBS | HEART RATE: 69 BPM

## 2022-02-23 VITALS
BODY MASS INDEX: 33.43 KG/M2 | OXYGEN SATURATION: 97 % | WEIGHT: 181 LBS | TEMPERATURE: 97.7 F | DIASTOLIC BLOOD PRESSURE: 80 MMHG | HEART RATE: 69 BPM | RESPIRATION RATE: 18 BRPM | SYSTOLIC BLOOD PRESSURE: 114 MMHG

## 2022-02-23 DIAGNOSIS — G47.33 OSA (OBSTRUCTIVE SLEEP APNEA): ICD-10-CM

## 2022-02-23 DIAGNOSIS — R06.02 SOB (SHORTNESS OF BREATH): ICD-10-CM

## 2022-02-23 DIAGNOSIS — J45.40 MODERATE PERSISTENT ASTHMA WITHOUT COMPLICATION: ICD-10-CM

## 2022-02-23 DIAGNOSIS — R06.02 SOB (SHORTNESS OF BREATH): Primary | ICD-10-CM

## 2022-02-23 DIAGNOSIS — G44.209 TENSION HEADACHE: ICD-10-CM

## 2022-02-23 DIAGNOSIS — R93.0 ABNORMAL MRI OF HEAD: Primary | ICD-10-CM

## 2022-02-23 LAB — LVEF ECHO: NORMAL

## 2022-02-23 PROCEDURE — 99205 OFFICE O/P NEW HI 60 MIN: CPT | Performed by: INTERNAL MEDICINE

## 2022-02-23 PROCEDURE — 93306 TTE W/DOPPLER COMPLETE: CPT

## 2022-02-23 PROCEDURE — 99207 PR CDG-CODE CATEGORY CHANGED: CPT

## 2022-02-23 PROCEDURE — 71046 X-RAY EXAM CHEST 2 VIEWS: CPT

## 2022-02-23 PROCEDURE — 99214 OFFICE O/P EST MOD 30 MIN: CPT

## 2022-02-23 PROCEDURE — 93306 TTE W/DOPPLER COMPLETE: CPT | Mod: 26 | Performed by: INTERNAL MEDICINE

## 2022-02-23 ASSESSMENT — PAIN SCALES - GENERAL
PAINLEVEL: NO PAIN (0)
PAINLEVEL: NO PAIN (0)

## 2022-02-23 NOTE — PROGRESS NOTES
**Medical student consult note - Educational use only**    [facility] Neurology Consultation    Carolyn Saldivar MRN# [id number]   Age: 61 year old YOB: 1960     Requesting physician: Ronda Mena  [facility]     Reason for Consultation: tension headache and abnormal MRI of head        History of Presenting Symptoms:   Carolyn Saldivar is a 61 year old female with past medical history including hypertension, hyperlipidemia, ROSA on CPAP, anxiety, depression, history of herniated cervical disc s/p disc replacement in 2008 who presents today for evaluation of daily headaches.    Patient reports headaches started a couple years ago, around Spring 2020. They went away and returned around Spring 2021. Headaches progressed to occurring every day and constant from the time patient wakes up to the time patient goes to bed in the evening. They are located in the back of the head and radiate up toward the top of the head. Associations include muscle tightness in upper back and lower neck which radiates over left shoulder, no radiation down left arm. No throbbing quality to pain. No photophobia or phonophobia. No nausea or vomiting. No dizziness. Pain has not woken patient up from sleep in the middle of the night. She was taking Tylenol and ibuprofen every 4-6 hours every day which was effective in relieving symptoms. Sometimes lying down has helped her symptoms.    PCP visit on 8/23/2021 addressed headaches. Prescribed cyclobenzaprine which patient had headache relief from. PCP ordered MRIs of brain and cervical spine with concern that headaches are related to disc replacement surgery in 2008. Followed-up MRI of cervical spine with Pain Medicine and had cortisone shot in October. After cortisone shot, patient reports headaches ceased completely for a couple months and she did not take OTC analgesics at all. Now, she has mild headaches a few days per week which she says are well-controlled by taking a  couple Tylenol. Headaches are the same in location and quality as she experienced previously with addition of some sinus area involvement.    Patient saw Neurologist at [facility] on 02/09/2022 for headaches. Neurologist assessment was tension type headaches and recommended trigger point injections and PT. Patient did not do injections or PT.    Other health history updates:  - Patient had episode in December 2021 while in [place] of passing out in middle of the night. Had never had episode like that before and has not had since. Presented to the ED and had an EKG which patient reported was normal.  - Some tingling in hands patient has had for years. Was diagnosed with carpal tunnel and has splints that she wears at night. Feels like well-controlled. Enjoys quilting.  - Has noticed some new swelling in lower extremities.  - Breathing concern, following with Pulmonology and had visit today. Has albuterol inhaler and previously thought this was asthma, not sure this is asthma    Patient is concerned about MRI report mention of white matter changes. She does not have a history of neurologic diagnoses and does not recall episodes of unexplained neurologic symptoms.        Past Medical History:     Patient Active Problem List   Diagnosis     Moderate major depression (H)     Generalized anxiety disorder     Benign essential hypertension     Allergic rhinitis due to pollen     Carpal tunnel syndrome     Diverticulosis of colon     Eczema     Family history of colonic polyps     Heartburn     Hypertriglyceridemia     Obesity (BMI 30.0-34.9)     ROSA on CPAP     Routine general medical examination at a health care facility     Situational depression     Status post shoulder surgery     Former smoker     Past Medical History:   Diagnosis Date     Anxiety      Depressive disorder      Hypertension      Sleep apnea         Past Surgical History:     Past Surgical History:   Procedure Laterality Date      COLONOSCOPY  2010     COLONOSCOPY N/A 2021    Procedure: COLONOSCOPY, WITH POLYPECTOMY;  Surgeon: Ruby Otero DO;  [place] [facility] [place]     [facility] CERV SPINE FUSN,ANTER,BELOW C2  2008    [facility]      disc replacement at [facility]:  - Cervical spondylosis and stenosis with extruded left C6 disk with left C7 radiculopathy.       Social History:     Social History     Tobacco Use     Smoking status: Former Smoker     Packs/day: 0.50     Years: 15.00     Pack years: 7.50     Types: Cigarettes     Start date:      Quit date:      Years since quittin.1     Smokeless tobacco: Never Used   Vaping Use     Vaping Use: Never used   Substance Use Topics     Alcohol use: Yes     Comment: occasionally     Drug use: Never        Family History:     Family History   Problem Relation Age of Onset     Heart Failure Mother      Coronary Artery Disease Mother      Valvular heart disease Sister         Medications:     Current Outpatient Medications   Medication Sig     albuterol (PROAIR HFA) 108 (90 Base) MCG/ACT inhaler Inhale 2 puffs into the lungs every 4 hours as needed for shortness of breath / dyspnea     atorvastatin (LIPITOR) 10 MG tablet Take 1 tablet (10 mg) by mouth daily     cyclobenzaprine (FLEXERIL) 5 MG tablet Take 1-2 tablets (5-10 mg) by mouth 3 times daily as needed for muscle spasms Do not drive or operate machinery on medication     FLUoxetine (PROZAC) 10 MG capsule Take 1 capsule (10 mg) by mouth daily     fluticasone (FLONASE) 50 MCG/ACT nasal spray INSTILL 1 SPRAY NASALLY ONE TIME A DAY. SHAKE GENTLY BEFORE EACH USE; ALTERNATE NOSTRILS WITH EACH SPRAY.     fluticasone (FLOVENT HFA) 110 MCG/ACT inhaler Inhale 1 puff into the lungs 2 times daily     loratadine-pseudoePHEDrine (CLARITIN-D 24-HOUR)  MG 24 hr tablet Take 1 tablet by mouth daily     losartan (COZAAR) 25 MG tablet Take 1 tablet (25 mg) by mouth daily     Omega-3 Fatty Acids (FISH OIL-OMEGA-3 FATTY  ACID) 1000 MG DR capsule Take 4 capsules (4 g) by mouth daily     No current facility-administered medications for this visit.        Allergies:     Allergies   Allergen Reactions     Cats      Dogs      Seasonal Allergies Other (See Comments)     Birch trees, maple trees, aramis grass, orchard grass, mugwart, lamb's quarter, dust mites        Review of Systems:   A comprehensive 10 point review of systems (constitutional, ENT, cardiac, peripheral vascular, lymphatic, respiratory, GI, , Musculoskeletal, skin, Neurological) was performed and found to be negative except as described in this note.        Physical Exam:   Vitals: BP (!) 141/80   Pulse 69   Wt 82.1 kg (181 lb)   LMP  (LMP Unknown)   BMI 33.43 kg/m       General: Seated comfortably in no acute distress.  HEENT: Paracervical muscle tightness present, no tenderness.  Optic discs sharp and vasculature normal on funduscopic exam. No rhinorrhea. Mucous membranes moist.  Heart: Mild lower extremity edema  Lungs: No dyspnea or increased work of breathing on room air and with conversation, no cough  GI: Non-distended  Extremities: No gross deformities visualized  Skin: No rashes on skin of face  Neurologic:     Mental Status: Fully alert, attentive and oriented. Normal memory and fund of knowledge. Language normal, speech clear and fluent, no paraphasic errors.     Cranial Nerves: PERRL. EOMI with normal smooth pursuit. Facial sensation intact/symmetric. Facial movements symmetric. Hearing not formally tested but intact to conversation. Palate elevation symmetric, uvula midline. No dysarthria. Shoulder shrug strong bilaterally. Tongue protrusion midline.     Motor: No tremors or other abnormal movements observed. Muscle tone normal throughout. Normal/symmetric rapid finger tapping. Strength 5/5 throughout upper and lower extremities including 5/5 APL.     Deep Tendon Reflexes: 2+/symmetric throughout upper and lower extremities. No clonus. Toes downgoing  bilaterally.      Sensory: Intact/symmetric to light touch, pinprick, vibration and proprioception throughout upper and lower extremities. Negative Romberg.     Coordination: Finger-nose-finger and heel-shin intact without dysmetria. Rapid alternating movements intact/symmetric with normal speed and rhythm.     Gait: Normal, steady casual gait. Able to walk on toes, heels and tandem without difficulty.           Data: Pertinent prior to visit   Imaging:  MR Brain from 08/26/2021 -  1. No acute abnormality.  2. Scattered white matter T2 hyperintensities which presumably  represent chronic small vessel ischemic change.  3. Patchy area of T2 hyperintense signal within the left frontal white  matter measuring up to 1.3 cm. This could represent chronic small  vessel ischemic change; however, sequela of demyelination, old  infectious/inflammatory insult, or primary brain lesion cannot be  excluded. Recommend follow-up MRI in 6-12 months.  4. Paranasal sinus mucosal thickening, worse involving the bilateral  ethmoid and right frontal sinuses.    MR Cervical Spine from 09/02/2021 -   1. Solid bony fusion across the C6-C7 disc space with probable disc  spacer at that level. No spinal canal or neural foraminal narrowing at  the postoperative level.  2. Degenerative changes in the cervical spine, most pronounced at  C4-C5 and C5-C6 as detailed above.  3. Mild right neural foraminal narrowing at C5-C6. No other  significant spinal canal or neural foraminal narrowings.     Procedures:  Translaminar Epidural Steroid Injection in C7-T1 epidural space on 09/28/21    Laboratory:  8/23/2021 labs including Iron panel, CBC, CMP normal; HIV negative; LDL and triglycerides elevated, decreased from last         Assessment and Plan:   Assessment:  Carolyn Saldivar is a 61 year old female with past medical history including hypertension, hyperlipidemia, ROSA on CPAP, anxiety, depression, history of herniated cervical disc s/p disc replacement  in 2008 who presents today for evaluation of daily headaches requiring daily OTC analgesics and follow-up of brain MRI results. Headaches are tension-type. They improved significantly after cervical epidural steroid injection and also had improvement with cyclobenzaprine, suggestive that headaches are largely triggered by cervical spine pain from degenerative changes seen on MRI. Concern when headaches were more frequent for component of medication overuse headaches, although reassuring that headaches did not continue after steroid injection despite stopping analgesics altogether at that time. I suspect return of headaches is likely due to decreasing effects of steroid injection and closely linked to cervical spinal pain.    Regarding brain MRI, T2 hyperintense signal within the left frontal white matter is likely chronic and may be evidence of historical inflammatory process. Patient does not have history of unexplained neurological episode(s) such as symptoms of demyelination, CNS infection, or central lesion that may explain this finding, and finding is likely incidental. However, to more definitely rule out a more acute process, agree with previous recommendations to follow-up with MRI in 6-12 months for comparison.     Plan:  - Follow-up with pain medicine regarding headache return and consider repeat steroid injection when eligible  - Consider starting PT in conjunction to steroid injections  - Decrease OTC analgesic use as able with goal to not use more than 10 days per month  - MRI of brain for comparison to previous, to be scheduled in May when patient returns from [place]    Follow up in Neurology clinic as needed should new concerns arise.      Diya Rock, MS3    Physician Attestation   I, Meliton Thompson, was present with the medical/[employer]AS[employer] student who participated in the service and in the documentation of the note.  I have verified the history and personally performed the physical exam and medical  decision making.  I agree with the assessment and plan of care as documented in the note. Please see separate consult note for complete plan.

## 2022-02-23 NOTE — LETTER
2/23/2022         RE: Carolyn Saldivar  [address]  [facility] [address]        Dear Colleague,    Thank you for referring your patient, Carolyn Saldivar, to the [facility] [place]. Please see a copy of my visit note below.    **Medical student consult note - Educational use only**    [facility] Neurology Consultation    Carolyn Saldivar MRN# [id number]   Age: 61 year old YOB: 1960     Requesting physician: Ronda Mena  [facility]     Reason for Consultation: tension headache and abnormal MRI of head        History of Presenting Symptoms:   Carolyn Saldivar is a 61 year old female with past medical history including hypertension, hyperlipidemia, ROSA on CPAP, anxiety, depression, history of herniated cervical disc s/p disc replacement in 2008 who presents today for evaluation of daily headaches.    Patient reports headaches started a couple years ago, around Spring 2020. They went away and returned around Spring 2021. Headaches progressed to occurring every day and constant from the time patient wakes up to the time patient goes to bed in the evening. They are located in the back of the head and radiate up toward the top of the head. Associations include muscle tightness in upper back and lower neck which radiates over left shoulder, no radiation down left arm. No throbbing quality to pain. No photophobia or phonophobia. No nausea or vomiting. No dizziness. Pain has not woken patient up from sleep in the middle of the night. She was taking Tylenol and ibuprofen every 4-6 hours every day which was effective in relieving symptoms. Sometimes lying down has helped her symptoms.    PCP visit on 8/23/2021 addressed headaches. Prescribed cyclobenzaprine which patient had headache relief from. PCP ordered MRIs of brain and cervical spine with concern that headaches are related to disc replacement surgery in 2008. Followed-up MRI of cervical spine with  Pain Medicine and had cortisone shot in October. After cortisone shot, patient reports headaches ceased completely for a couple months and she did not take OTC analgesics at all. Now, she has mild headaches a few days per week which she says are well-controlled by taking a couple Tylenol. Headaches are the same in location and quality as she experienced previously with addition of some sinus area involvement.    Patient saw Neurologist at [facility] on 02/09/2022 for headaches. Neurologist assessment was tension type headaches and recommended trigger point injections and PT. Patient did not do injections or PT.    Other health history updates:  - Patient had episode in December 2021 while in [place] of passing out in middle of the night. Had never had episode like that before and has not had since. Presented to the ED and had an EKG which patient reported was normal.  - Some tingling in hands patient has had for years. Was diagnosed with carpal tunnel and has splints that she wears at night. Feels like well-controlled. Enjoys quilting.  - Has noticed some new swelling in lower extremities.  - Breathing concern, following with Pulmonology and had visit today. Has albuterol inhaler and previously thought this was asthma, not sure this is asthma    Patient is concerned about MRI report mention of white matter changes. She does not have a history of neurologic diagnoses and does not recall episodes of unexplained neurologic symptoms.        Past Medical History:     Patient Active Problem List   Diagnosis     Moderate major depression (H)     Generalized anxiety disorder     Benign essential hypertension     Allergic rhinitis due to pollen     Carpal tunnel syndrome     Diverticulosis of colon     Eczema     Family history of colonic polyps     Heartburn     Hypertriglyceridemia     Obesity (BMI 30.0-34.9)     ROSA on CPAP     Routine general medical examination at a health care facility      Situational depression     Status post shoulder surgery     Former smoker     Past Medical History:   Diagnosis Date     Anxiety      Depressive disorder      Hypertension      Sleep apnea         Past Surgical History:     Past Surgical History:   Procedure Laterality Date     COLONOSCOPY  2010     COLONOSCOPY N/A 2021    Procedure: COLONOSCOPY, WITH POLYPECTOMY;  Surgeon: Ruby Otero DO;  Location: [facility] OR     [facility] CERV SPINE FUSN,ANTER,BELOW C2  2008    [facility]      disc replacement at [facility]:  - Cervical spondylosis and stenosis with extruded left C6 disk with left C7 radiculopathy.       Social History:     Social History     Tobacco Use     Smoking status: Former Smoker     Packs/day: 0.50     Years: 15.00     Pack years: 7.50     Types: Cigarettes     Start date:      Quit date:      Years since quittin.1     Smokeless tobacco: Never Used   Vaping Use     Vaping Use: Never used   Substance Use Topics     Alcohol use: Yes     Comment: occasionally     Drug use: Never        Family History:     Family History   Problem Relation Age of Onset     Heart Failure Mother      Coronary Artery Disease Mother      Valvular heart disease Sister         Medications:     Current Outpatient Medications   Medication Sig     albuterol (PROAIR HFA) 108 (90 Base) MCG/ACT inhaler Inhale 2 puffs into the lungs every 4 hours as needed for shortness of breath / dyspnea     atorvastatin (LIPITOR) 10 MG tablet Take 1 tablet (10 mg) by mouth daily     cyclobenzaprine (FLEXERIL) 5 MG tablet Take 1-2 tablets (5-10 mg) by mouth 3 times daily as needed for muscle spasms Do not drive or operate machinery on medication     FLUoxetine (PROZAC) 10 MG capsule Take 1 capsule (10 mg) by mouth daily     fluticasone (FLONASE) 50 MCG/ACT nasal spray INSTILL 1 SPRAY NASALLY ONE TIME A DAY. SHAKE GENTLY BEFORE EACH USE; ALTERNATE NOSTRILS WITH EACH SPRAY.     fluticasone (FLOVENT HFA) 110  MCG/ACT inhaler Inhale 1 puff into the lungs 2 times daily     loratadine-pseudoePHEDrine (CLARITIN-D 24-HOUR)  MG 24 hr tablet Take 1 tablet by mouth daily     losartan (COZAAR) 25 MG tablet Take 1 tablet (25 mg) by mouth daily     Omega-3 Fatty Acids (FISH OIL-OMEGA-3 FATTY ACID) 1000 MG DR capsule Take 4 capsules (4 g) by mouth daily     No current facility-administered medications for this visit.        Allergies:     Allergies   Allergen Reactions     Cats      Dogs      Seasonal Allergies Other (See Comments)     Birch trees, maple trees, aramis grass, orchard grass, mugwart, lamb's quarter, dust mites        Review of Systems:   A comprehensive 10 point review of systems (constitutional, ENT, cardiac, peripheral vascular, lymphatic, respiratory, GI, , Musculoskeletal, skin, Neurological) was performed and found to be negative except as described in this note.        Physical Exam:   Vitals: BP (!) 141/80   Pulse 69   Wt 82.1 kg (181 lb)   LMP  (LMP Unknown)   BMI 33.43 kg/m       General: Seated comfortably in no acute distress.  HEENT: Paracervical muscle tightness present, no tenderness.  Optic discs sharp and vasculature normal on funduscopic exam. No rhinorrhea. Mucous membranes moist.  Heart: Mild lower extremity edema  Lungs: No dyspnea or increased work of breathing on room air and with conversation, no cough  GI: Non-distended  Extremities: No gross deformities visualized  Skin: No rashes on skin of face  Neurologic:     Mental Status: Fully alert, attentive and oriented. Normal memory and fund of knowledge. Language normal, speech clear and fluent, no paraphasic errors.     Cranial Nerves: PERRL. EOMI with normal smooth pursuit. Facial sensation intact/symmetric. Facial movements symmetric. Hearing not formally tested but intact to conversation. Palate elevation symmetric, uvula midline. No dysarthria. Shoulder shrug strong bilaterally. Tongue protrusion midline.     Motor: No tremors or  other abnormal movements observed. Muscle tone normal throughout. Normal/symmetric rapid finger tapping. Strength 5/5 throughout upper and lower extremities including 5/5 APL.     Deep Tendon Reflexes: 2+/symmetric throughout upper and lower extremities. No clonus. Toes downgoing bilaterally.      Sensory: Intact/symmetric to light touch, pinprick, vibration and proprioception throughout upper and lower extremities. Negative Romberg.     Coordination: Finger-nose-finger and heel-shin intact without dysmetria. Rapid alternating movements intact/symmetric with normal speed and rhythm.     Gait: Normal, steady casual gait. Able to walk on toes, heels and tandem without difficulty.           Data: Pertinent prior to visit   Imaging:  MR Brain from 08/26/2021 -  1. No acute abnormality.  2. Scattered white matter T2 hyperintensities which presumably  represent chronic small vessel ischemic change.  3. Patchy area of T2 hyperintense signal within the left frontal white  matter measuring up to 1.3 cm. This could represent chronic small  vessel ischemic change; however, sequela of demyelination, old  infectious/inflammatory insult, or primary brain lesion cannot be  excluded. Recommend follow-up MRI in 6-12 months.  4. Paranasal sinus mucosal thickening, worse involving the bilateral  ethmoid and right frontal sinuses.    MR Cervical Spine from 09/02/2021 -   1. Solid bony fusion across the C6-C7 disc space with probable disc  spacer at that level. No spinal canal or neural foraminal narrowing at  the postoperative level.  2. Degenerative changes in the cervical spine, most pronounced at  C4-C5 and C5-C6 as detailed above.  3. Mild right neural foraminal narrowing at C5-C6. No other  significant spinal canal or neural foraminal narrowings.     Procedures:  Translaminar Epidural Steroid Injection in C7-T1 epidural space on 09/28/21    Laboratory:  8/23/2021 labs including Iron panel, CBC, CMP normal; HIV negative; LDL and  triglycerides elevated, decreased from last         Assessment and Plan:   Assessment:  Carolyn Saldivar is a 61 year old female with past medical history including hypertension, hyperlipidemia, ROSA on CPAP, anxiety, depression, history of herniated cervical disc s/p disc replacement in 2008 who presents today for evaluation of daily headaches requiring daily OTC analgesics and follow-up of brain MRI results. Headaches are tension-type. They improved significantly after cervical epidural steroid injection and also had improvement with cyclobenzaprine, suggestive that headaches are largely triggered by cervical spine pain from degenerative changes seen on MRI. Concern when headaches were more frequent for component of medication overuse headaches, although reassuring that headaches did not continue after steroid injection despite stopping analgesics altogether at that time. I suspect return of headaches is likely due to decreasing effects of steroid injection and closely linked to cervical spinal pain.    Regarding brain MRI, T2 hyperintense signal within the left frontal white matter is likely chronic and may be evidence of historical inflammatory process. Patient does not have history of unexplained neurological episode(s) such as symptoms of demyelination, CNS infection, or central lesion that may explain this finding, and finding is likely incidental. However, to more definitely rule out a more acute process, agree with previous recommendations to follow-up with MRI in 6-12 months for comparison.     Plan:  - Follow-up with pain medicine regarding headache return and consider repeat steroid injection when eligible  - Consider starting PT in conjunction to steroid injections  - Decrease OTC analgesic use as able with goal to not use more than 10 days per month  - MRI of brain for comparison to previous, to be scheduled in May when patient returns from [place]    Follow up in Neurology clinic as needed should new  concerns arise.      Diya Rock, MS3    Physician Attestation   I, Meliton Thompson, was present with the medical/[employer]AS[employer] student who participated in the service and in the documentation of the note.  I have verified the history and personally performed the physical exam and medical decision making.  I agree with the assessment and plan of care as documented in the note. Please see separate consult note for complete plan.       [facility] Neurology Consultation    Carolyn Saldivar MRN# [id number]   Age: 61 year old YOB: 1960     Requesting physician: Ronda Rodriguez     Reason for Consultation: abnormal MRI brain and headaches      History of Presenting Symptoms:   Carolyn Saldivar is a 61 year old female who presents today for evaluation of abnormal MRI brain and headaches.     Patient reports that initial headaches started around spring 2020. Prior to that she did not get frequent headaches. These headaches improved over several months and they did not return until spring 2021. Frequency of headaches at this time gradually worsened to occurring daily. Headaches would often start in the back of the head and neck. Through the day they would often migrate to the front of her head as well. She had no migrainous symptoms like light/sound sensitivity or nausea. She was taking tylenol or ibuprofen daily and this would temporarily give her relief. In the fall of 2021 she had a cervical epidural cortisone injection at C7/T1. This resulted in dramatic improvement in both the neck pain and the headaches. She also was given relief from prn Flexeril. Since these interventions, headaches have not been bothersome.     For work-up of headaches patient had a MRI brain in 8/2021, which showed non specific left frontal hyper intensity as described below. She denies any prior history of transient vision loss, prolonged numbness/weakness or balance issues.     She reports intermittent tingling in the hands  and has been told it is related to carpal tunnel syndrome. She has gotten relief from wearing wrist braces as needed.       Past Medical History:     Patient Active Problem List   Diagnosis     Moderate major depression (H)     Generalized anxiety disorder     Benign essential hypertension     Allergic rhinitis due to pollen     Carpal tunnel syndrome     Diverticulosis of colon     Eczema     Family history of colonic polyps     Heartburn     Hypertriglyceridemia     Obesity (BMI 30.0-34.9)     ROSA on CPAP     Routine general medical examination at a health care facility     Situational depression     Status post shoulder surgery     Former smoker     Past Medical History:   Diagnosis Date     Anxiety      Depressive disorder      Hypertension      Sleep apnea         Past Surgical History:     Past Surgical History:   Procedure Laterality Date     COLONOSCOPY  2010     COLONOSCOPY N/A 2021    Procedure: COLONOSCOPY, WITH POLYPECTOMY;  Surgeon: Ruby Otero DO;  [place] [facility] [place]     [facility] CERV SPINE FUSN,ANTER,BELOW C2  2008    [facility]        Social History:     Social History     Tobacco Use     Smoking status: Former Smoker     Packs/day: 0.50     Years: 15.00     Pack years: 7.50     Types: Cigarettes     Start date:      Quit date:      Years since quittin.1     Smokeless tobacco: Never Used   Vaping Use     Vaping Use: Never used   Substance Use Topics     Alcohol use: Yes     Comment: occasionally     Drug use: Never        Family History:     Family History   Problem Relation Age of Onset     Heart Failure Mother      Coronary Artery Disease Mother      Valvular heart disease Sister         Medications:     Current Outpatient Medications   Medication Sig     albuterol (PROAIR HFA) 108 (90 Base) MCG/ACT inhaler Inhale 2 puffs into the lungs every 4 hours as needed for shortness of breath / dyspnea     atorvastatin (LIPITOR) 10 MG tablet Take 1 tablet (10 mg) by mouth  daily     cyclobenzaprine (FLEXERIL) 5 MG tablet Take 1-2 tablets (5-10 mg) by mouth 3 times daily as needed for muscle spasms Do not drive or operate machinery on medication     FLUoxetine (PROZAC) 10 MG capsule Take 1 capsule (10 mg) by mouth daily     fluticasone (FLONASE) 50 MCG/ACT nasal spray INSTILL 1 SPRAY NASALLY ONE TIME A DAY. SHAKE GENTLY BEFORE EACH USE; ALTERNATE NOSTRILS WITH EACH SPRAY.     fluticasone (FLOVENT HFA) 110 MCG/ACT inhaler Inhale 1 puff into the lungs 2 times daily     loratadine-pseudoePHEDrine (CLARITIN-D 24-HOUR)  MG 24 hr tablet Take 1 tablet by mouth daily     losartan (COZAAR) 25 MG tablet Take 1 tablet (25 mg) by mouth daily     Omega-3 Fatty Acids (FISH OIL-OMEGA-3 FATTY ACID) 1000 MG DR capsule Take 4 capsules (4 g) by mouth daily     No current facility-administered medications for this visit.        Allergies:     Allergies   Allergen Reactions     Cats      Dogs      Seasonal Allergies Other (See Comments)     Birch trees, maple trees, aramis grass, orchard grass, mugwart, lamb's quarter, dust mites        Review of Systems:   As above     Physical Exam:   Vitals: BP (!) 141/80   Pulse 69   Wt 82.1 kg (181 lb)   LMP  (LMP Unknown)   BMI 33.43 kg/m     General: Seated comfortably in no acute distress.  HEENT: Neck supple with normal range of motion. Mild paracervical muscle tenderness and tightness.  Optic discs sharp and vasculature normal on funduscopic exam.   Lungs: breathing comfortably  Extremities: no edema  Skin: No rashes  Neurologic:     Mental Status: Fully alert, attentive. Normal memory and fund of knowledge. Language normal, speech clear and fluent, no paraphasic errors.      Cranial Nerves: Visual fields intact. PERRL. EOMI with normal smooth pursuit. Facial sensation intact/symmetric. Facial movements symmetric. Hearing not formally tested but intact to conversation. Palate elevation symmetric, uvula midline. No dysarthria. Shoulder shrug strong  bilaterally. Tongue protrusion midline.     Motor: No tremors or other abnormal movements observed. Muscle tone normal throughout. Normal/symmetric rapid finger tapping. With exception of 5-/5 bilateral APB, strength 5/5 throughout upper and lower extremities.     Deep Tendon Reflexes: 2+/symmetric throughout upper and lower extremities. No clonus. Toes downgoing bilaterally.     Sensory: Intact/symmetric to light touch, pinprick, temperature, vibration and proprioception throughout upper and lower extremities. Negative Romberg.      Coordination: Finger-nose-finger and heel-shin intact without dysmetria. Rapid alternating movements intact/symmetric with normal speed and rhythm.     Gait: Normal, steady casual gait. Able to walk on toes, heels and tandem without difficulty.         Data: Pertinent prior to visit   Imaging:  MR Brain from 08/26/2021 -  1. No acute abnormality.  2. Scattered white matter T2 hyperintensities which presumably  represent chronic small vessel ischemic change.  3. Patchy area of T2 hyperintense signal within the left frontal white  matter measuring up to 1.3 cm. This could represent chronic small  vessel ischemic change; however, sequela of demyelination, old  infectious/inflammatory insult, or primary brain lesion cannot be  excluded. Recommend follow-up MRI in 6-12 months.  4. Paranasal sinus mucosal thickening, worse involving the bilateral  ethmoid and right frontal sinuses.     MR Cervical Spine from 09/02/2021 -   1. Solid bony fusion across the C6-C7 disc space with probable disc  spacer at that level. No spinal canal or neural foraminal narrowing at  the postoperative level.  2. Degenerative changes in the cervical spine, most pronounced at  C4-C5 and C5-C6 as detailed above.  3. Mild right neural foraminal narrowing at C5-C6. No other  significant spinal canal or neural foraminal narrowings.     Personally reviewed and agree with above impression         Assessment and Plan:    Assessment:  Carolyn Saldivar is a 61 year old female who presents today for evaluation of abnormal MRI brain and headaches. Patient had frequent headaches from spring 2021 to fall 2021. These improved dramatically after cervical epidural cortisone injection in fall 2021. Description of headaches is suggestive of tension-type headaches. We discussed if symptoms return patient could discuss repeat cortisone injection with pain doctor or physical therapy referral could be considered.     MRI brain was reviewed and shows moderate sized non specific left frontal T2 hyper intensity without abnormal contrast enhancement or DWI changes. Appearance of lesion is suggestive of a chronic lesion. A remote demyelinating lesion is possible. Patient does not have a history of suggestive of prior symptoms related to this lesion. We discussed repeating imaging to ensure stability of lesion.        Plan:  - Call with worsening of headaches  - Repeat MRI brain with and without contrast    Follow up in Neurology clinic as needed should new concerns arise.    Meliton Thompson MD   of Neurology  [facility]      The total time of this encounter today amounted to 67 minutes. This time included time spent with the patient, prep work, ordering tests, and performing post visit documentation.        Again, thank you for allowing me to participate in the care of your patient.        Sincerely,        Meliton Thompson MD

## 2022-02-23 NOTE — PROGRESS NOTES
[facility]  Pulmonary Medicine  Visit Clinic Note  February 23, 2022         ASSESSMENT & PLAN       Shortness of breath  Possible history of asthma  Syncope  Cough  ROSA    She has had progressive shortness of breath over the last year.  There is also been one episode of syncope.  She is taking an inhaled corticosteroid, Flovent, with minimal success.  On exam, she has a subtle end expiratory wheeze.  It could be that her Flovent is just not enough for her airflow obstruction.  I will repeat spirometry pre and postbronchodilator.  I will also get a 2 view chest x-ray    Her episode of syncope could be micturition related given her history.  However in the context of her progressive shortness of breath, we should consider her heart.  Her sister had valvular heart disease, and her mom had coronary artery disease.  We will start off with an echocardiogram, but she might need a stress test in the near future as well.  Pulmonary hypertension could also be evaluated on the echocardiogram.    I will refer her to the sleep clinic for follow-up on her obstructive sleep apnea    I will get in touch with her after I get her results back.  We can decide on increasing her inhaler dose versus pursuing more dyspnea testing.      Jermaine Forte MD     I spent 38 minutes on the date of the encounter doing chart review, history and exam, documentation and discussing diagnostic plan         Today's visit note:     Chief Complaint: Asthma (Always Short of Breath. ) and Breathing Problem      HISTORY OF PRESENT ILLNESS:    Carolyn Saldivar is a 61 year old year old female who is being seen for shortness of breath.    Last time I met her was over a virtual clinic visit in late 2020.  Her symptoms have steadily progressed since then.  Now she is short of breath every day with minimal amounts of work.  Even walking around the house or walking up stairs causes her significant shortness of breath.  She had one really bad  episode when she was in [place] climbing up steps and she was substantially short of breath.  The past, her breathing was better in [place] than it is in the [place].  However that this is not the case anymore.  She is short of breath in [place] as well.  She has a cough, which is slightly productive.  She uses a CPAP machine every night.  She is diagnosed with obstructive sleep apnea about 8 years ago.  She has not had any follow-up in the sleep clinic since then though.    Couple years ago, she did receive some prednisone for coughing.  This helped out her symptoms a lot.  This is prior to her significant shortness of breath.    She did have Covid 1 month ago.  It is mostly all sinus and head related symptoms.  Fortunately, she never had any respiratory symptoms.    She does have an albuterol inhaler but she rarely ever uses it.  Her shortness of breath is present with exertion and comes down once she takes a break.  In general, it is never sustained.    She did have an episode of syncope when she was in [place].  She was urinating, and passed out.  She hit her head on the fall.  She went to the emergency department after this.  Had an electrocardiogram and blood work done.  Her heart was reportedly normal, and she was discharged from the emergency department.         Past Medical and Surgical History:     Past Medical History:   Diagnosis Date     Anxiety      Depressive disorder      Hypertension      Sleep apnea      Past Surgical History:   Procedure Laterality Date     COLONOSCOPY  05/18/2010     COLONOSCOPY N/A 5/25/2021    Procedure: COLONOSCOPY, WITH POLYPECTOMY;  Surgeon: Ruby Otero DO;  Location: [facility] [place]     [facility] CERV SPINE FUSN,ANTER,BELOW C2  06/08/2008    [facility]           Family History:     Family History   Problem Relation Age of Onset     Heart Failure Mother      Coronary Artery Disease Mother      Valvular heart disease Sister               Social History:     Social History      Socioeconomic History     Marital status:      Spouse name: Not on file     Number of children: Not on file     Years of education: Not on file     Highest education level: Not on file   Occupational History     Not on file   Tobacco Use     Smoking status: Former Smoker     Packs/day: 0.50     Years: 15.00     Pack years: 7.50     Types: Cigarettes     Start date:      Quit date:      Years since quittin.1     Smokeless tobacco: Never Used   Vaping Use     Vaping Use: Never used   Substance and Sexual Activity     Alcohol use: Yes     Comment: occasionally     Drug use: Never     Sexual activity: Not Currently   Other Topics Concern     Not on file   Social History Narrative     Not on file     Social Determinants of Health     Financial Resource Strain: Not on file   Food Insecurity: Not on file   Transportation Needs: Not on file   Physical Activity: Not on file   Stress: Not on file   Social Connections: Not on file   Intimate Partner Violence: Not on file   Housing Stability: Not on file            Medications:     Current Outpatient Medications   Medication     albuterol (PROAIR HFA) 108 (90 Base) MCG/ACT inhaler     atorvastatin (LIPITOR) 10 MG tablet     cyclobenzaprine (FLEXERIL) 5 MG tablet     FLUoxetine (PROZAC) 10 MG capsule     fluticasone (FLONASE) 50 MCG/ACT nasal spray     loratadine-pseudoePHEDrine (CLARITIN-D 24-HOUR)  MG 24 hr tablet     losartan (COZAAR) 25 MG tablet     Omega-3 Fatty Acids (FISH OIL-OMEGA-3 FATTY ACID) 1000 MG DR capsule     fluticasone (FLOVENT HFA) 110 MCG/ACT inhaler     No current facility-administered medications for this visit.            Review of Systems:       A complete review of systems was otherwise negative except as noted in the HPI.      PHYSICAL EXAM:  /80   Pulse 69   Temp 97.7  F (36.5  C) (Temporal)   Resp 18   Wt 82.1 kg (181 lb)   LMP  (LMP Unknown)   SpO2 97%   BMI 33.43 kg/m       General: Comfortable, No  apparent distress  Eyes: Anicteric  Ears: Hearing grossly normal  Mouth: Oral mucosa is moist, without any lesions. No oropharyngeal exudate.  Neck: supple, no thyromegaly  Lymphatics: No cervical or supraclavicular nodes  Respiratory: Good air movement. No crackles.  There is a quiet and expiratory wheeze, mostly heard in the right lung.  Cardiac: RRR, normal S1, S2. No murmurs. No JVD  Abdomen: Soft, NT/ND  Musculoskeletal: Extremities normal. No clubbing. No cyanosis. No edema.  Skin: No rash on limited exam  Neuro: Normal mentation. Normal speech.  Psych:Normal affect           Data:   All laboratory and imaging data reviewed.      PFT:         PFT Interpretation:  No airflow obstruction.  Normal lung volumes.  Normal diffusion capacity.  Valid Maneuver

## 2022-02-24 NOTE — PROGRESS NOTES
History of Present Illness  Pati Salcedo is a 79 y.o. female who presents today for management of    Chief Complaint   Patient presents with   [facility]     Liver concerns       Patient is here to establish care. Abdominal Pain  Patient complains of abdominal pain. The pain is described as aching, and is 5/10 in intensity. Pain is located in the RUQ without radiation. Onset was several years ago. Symptoms have been unchanged since. Aggravating factors: none. Alleviating factors: none. Associated symptoms: none. The patient denies anorexia, constipation, diarrhea, frequency, nausea and vomiting. Cough  Patient complains of nasal congestion and productive cough. Symptoms began 1 week ago. The cough is without wheezing, dyspnea or hemoptysis, productive of clear sputum and is aggravated by nothing Associated symptoms include:sputum production. Patient does not have new pets. Patient does not have a history of asthma. Patient does not have a history of environmental allergens. Patient does not have recent travel. Patient does not have a history of smoking. Patient  did not have previous Chest X-ray. Patient did not have had a PPD done. Urinary Incontinence  Patient complains of urinary incontinence. This has been present for 1 year. She leaks urine with coughing, sneezing. Patient describes the symptoms as  urine leaks with coughing/heavy physical activity, nocturia x 4. Factors associated with symptoms include none known. Evaluation to date includes none. Treatment to date includes none. Past Medical History  Past Medical History:   Diagnosis Date    Osteopenia     Urinary incontinence         Surgical History  Past Surgical History:   Procedure Laterality Date    HX ANKLE FRACTURE TX      HX TUBAL LIGATION      25 years ago        Current Medications  Current Outpatient Prescriptions   Medication Sig    cholecalciferol (VITAMIN D3) 1,000 unit tablet Take  by mouth daily.     oxybutynin [facility] Neurology Consultation    Carolyn Saldivar MRN# [id number]   Age: 61 year old YOB: 1960     Requesting physician: Ronda Rodriguez     Reason for Consultation: abnormal MRI brain and headaches      History of Presenting Symptoms:   Carolyn Saldivar is a 61 year old female who presents today for evaluation of abnormal MRI brain and headaches.     Patient reports that initial headaches started around spring 2020. Prior to that she did not get frequent headaches. These headaches improved over several months and they did not return until spring 2021. Frequency of headaches at this time gradually worsened to occurring daily. Headaches would often start in the back of the head and neck. Through the day they would often migrate to the front of her head as well. She had no migrainous symptoms like light/sound sensitivity or nausea. She was taking tylenol or ibuprofen daily and this would temporarily give her relief. In the fall of 2021 she had a cervical epidural cortisone injection at C7/T1. This resulted in dramatic improvement in both the neck pain and the headaches. She also was given relief from prn Flexeril. Since these interventions, headaches have not been bothersome.     For work-up of headaches patient had a MRI brain in 8/2021, which showed non specific left frontal hyper intensity as described below. She denies any prior history of transient vision loss, prolonged numbness/weakness or balance issues.     She reports intermittent tingling in the hands and has been told it is related to carpal tunnel syndrome. She has gotten relief from wearing wrist braces as needed.       Past Medical History:     Patient Active Problem List   Diagnosis     Moderate major depression (H)     Generalized anxiety disorder     Benign essential hypertension     Allergic rhinitis due to pollen     Carpal tunnel syndrome     Diverticulosis of colon     Eczema     Family history of colonic polyps      Heartburn     Hypertriglyceridemia     Obesity (BMI 30.0-34.9)     ROSA on CPAP     Routine general medical examination at a health care facility     Situational depression     Status post shoulder surgery     Former smoker     Past Medical History:   Diagnosis Date     Anxiety      Depressive disorder      Hypertension      Sleep apnea         Past Surgical History:     Past Surgical History:   Procedure Laterality Date     COLONOSCOPY  2010     COLONOSCOPY N/A 2021    Procedure: COLONOSCOPY, WITH POLYPECTOMY;  Surgeon: Ruby Otero DO;  [place] [facility] [place] CERV SPINE FUSN,ANTER,BELOW C2  2008    [facility]        Social History:     Social History     Tobacco Use     Smoking status: Former Smoker     Packs/day: 0.50     Years: 15.00     Pack years: 7.50     Types: Cigarettes     Start date:      Quit date:      Years since quittin.1     Smokeless tobacco: Never Used   Vaping Use     Vaping Use: Never used   Substance Use Topics     Alcohol use: Yes     Comment: occasionally     Drug use: Never        Family History:     Family History   Problem Relation Age of Onset     Heart Failure Mother      Coronary Artery Disease Mother      Valvular heart disease Sister         Medications:     Current Outpatient Medications   Medication Sig     albuterol (PROAIR HFA) 108 (90 Base) MCG/ACT inhaler Inhale 2 puffs into the lungs every 4 hours as needed for shortness of breath / dyspnea     atorvastatin (LIPITOR) 10 MG tablet Take 1 tablet (10 mg) by mouth daily     cyclobenzaprine (FLEXERIL) 5 MG tablet Take 1-2 tablets (5-10 mg) by mouth 3 times daily as needed for muscle spasms Do not drive or operate machinery on medication     FLUoxetine (PROZAC) 10 MG capsule Take 1 capsule (10 mg) by mouth daily     fluticasone (FLONASE) 50 MCG/ACT nasal spray INSTILL 1 SPRAY NASALLY ONE TIME A DAY. SHAKE GENTLY BEFORE EACH USE; ALTERNATE NOSTRILS WITH EACH SPRAY.     fluticasone (FLOVENT  (DITROPAN) 5 mg tablet Take 1 Tab by mouth three (3) times daily.  calcium 500 mg Tab Take  by mouth.  MULTIVITAMIN PO Take  by mouth. No current facility-administered medications for this visit. Allergies/Drug Reactions  No Known Allergies     Family History  Family History   Problem Relation Age of Onset    Hypertension Father         Social History  Social History     Social History    Marital status:      Spouse name: N/A    Number of children: N/A    Years of education: N/A     Occupational History    Not on file. Social History Main Topics    Smoking status: Never Smoker    Smokeless tobacco: Never Used    Alcohol use No      Comment: occ    Drug use: No    Sexual activity: No     Other Topics Concern    Not on file     Social History Narrative       Health Maintenance   Topic Date Due    DTaP/Tdap/Td series (1 - Tdap) 09/[birth year]/1956    ZOSTER VACCINE AGE 60>  07/15/1995    GLAUCOMA SCREENING Q2Y  09/15/2000    Pneumococcal 65+ Low/Medium Risk (1 of 2 - PCV13) 09/15/2000    MEDICARE YEARLY EXAM  09/15/2000    Influenza Age 9 to Adult  08/01/2017    OSTEOPOROSIS SCREENING (DEXA)  Completed       There is no immunization history on file for this patient.     Review of Systems  General ROS: negative for - chills, fatigue, fever, weight gain or weight loss  Psychological ROS: negative for - behavioral disorder or depression  Ophthalmic ROS: negative  ENT ROS: negative  Hematological and Lymphatic ROS: negative  Respiratory ROS: positive for - cough  negative for - hemoptysis, shortness of breath, stridor or wheezing  Cardiovascular ROS: no chest pain or dyspnea on exertion  Gastrointestinal ROS: positive for - abdominal pain  negative for - appetite loss, blood in stools, constipation, diarrhea, heartburn or melena  Genito-Urinary ROS: positive for - incontinence  negative for - change in urinary stream or dysuria  Musculoskeletal ROS: positive for - joint pain and HFA) 110 MCG/ACT inhaler Inhale 1 puff into the lungs 2 times daily     loratadine-pseudoePHEDrine (CLARITIN-D 24-HOUR)  MG 24 hr tablet Take 1 tablet by mouth daily     losartan (COZAAR) 25 MG tablet Take 1 tablet (25 mg) by mouth daily     Omega-3 Fatty Acids (FISH OIL-OMEGA-3 FATTY ACID) 1000 MG DR capsule Take 4 capsules (4 g) by mouth daily     No current facility-administered medications for this visit.        Allergies:     Allergies   Allergen Reactions     Cats      Dogs      Seasonal Allergies Other (See Comments)     Birch trees, maple trees, aramis grass, orchard grass, mugwart, lamb's quarter, dust mites        Review of Systems:   As above     Physical Exam:   Vitals: BP (!) 141/80   Pulse 69   Wt 82.1 kg (181 lb)   LMP  (LMP Unknown)   BMI 33.43 kg/m     General: Seated comfortably in no acute distress.  HEENT: Neck supple with normal range of motion. Mild paracervical muscle tenderness and tightness.  Optic discs sharp and vasculature normal on funduscopic exam.   Lungs: breathing comfortably  Extremities: no edema  Skin: No rashes  Neurologic:     Mental Status: Fully alert, attentive. Normal memory and fund of knowledge. Language normal, speech clear and fluent, no paraphasic errors.      Cranial Nerves: Visual fields intact. PERRL. EOMI with normal smooth pursuit. Facial sensation intact/symmetric. Facial movements symmetric. Hearing not formally tested but intact to conversation. Palate elevation symmetric, uvula midline. No dysarthria. Shoulder shrug strong bilaterally. Tongue protrusion midline.     Motor: No tremors or other abnormal movements observed. Muscle tone normal throughout. Normal/symmetric rapid finger tapping. With exception of 5-/5 bilateral APB, strength 5/5 throughout upper and lower extremities.     Deep Tendon Reflexes: 2+/symmetric throughout upper and lower extremities. No clonus. Toes downgoing bilaterally.     Sensory: Intact/symmetric to light touch, pinprick,  muscle pain  Neurological ROS: negative  Dermatological ROS: negative    No exam data present    Physical Exam  Vital signs:   Vitals:    12/12/17 0855   BP: 130/78   Pulse: 69   Resp: 20   Temp: 97.8 °F (36.6 °C)   TempSrc: Oral   SpO2: 98%   Weight: 189 lb 9.6 oz (86 kg)   Height: 5' 4\" (1.626 m)       General: alert, oriented, not in distress  Head: scalp normal, atraumatic  Eyes: pupils are equal and reactive, full and intact EOM's  Ears: patent ear canal, intact tympanic membrane  Nose: normal turbinates, no congestion or discharge  Lips/Mouth: moist lips and buccal mucosa, non-enlarged tonsils, pink throat  Neck: supple, no JVD, no lymphadenopathy, non-palpable thyroid  Chest/Lungs: clear breath sounds, no wheezing or crackles  Heart: normal rate, regular rhythm, no murmur  Abdomen: soft, non-distended, non-tender, normal bowel sounds, no organomegaly, no masses  Extremities: no focal deformities, no edema  Skin: no active skin lesions    Laboratory/Tests:  CT ABDOMEN/PELVIS 3/15/16  EXAM: CT ABD PELV W CONT     CLINICAL HISTORY: LLQ abdominal pain         TECHNIQUE: Axial CT scan of the abdomen and pelvis with   IV contrast      including coronal and sagittal reconstructions.     COMPARISON: None     FINDINGS:  Lower thorax: No acute findings     ABDOMEN:  Liver: Unremarkable. No mass. Gallbladder and bile ducts: Unremarkable. No calcified stones. No ductal  dilatation. Pancreas: Unremarkable. No ductal dilatation. No mass. Spleen: Unremarkable. No splenomegaly. Adrenals: Unremarkable. No mass. Kidneys and ureters: Unremarkable. No hydronephrosis. No solid mass. Appendix: No findings to suggest appendicitis. Stomach and bowel: Stomach unremarkable. No obstruction. No mucosal thickening. No inflammatory changes. Moderate quantity of stool. Peritoneum: Unremarkable. No significant fluid collection. No free air. Lymph nodes: Unremarkable. No enlarged lymph nodes.   Vasculature: Evidence of pelvic congestion syndrome. Bones: No acute fracture. Abdominal wall: Unremarkable. No evidence of a hernia.     PELVIS:  Bladder: Unremarkable. No mass. Reproductive: Presence of fibroids. Increased vascularity.     IMPRESSION:     1. Pelvic congestive syndrome. 2. Uterine fibroids. 3. Constipation. Assessment/Plan:      ICD-10-CM ICD-9-CM    1. Right upper quadrant abdominal pain R10.11 789.01 CBC WITH AUTOMATED DIFF      HCV AB W/RFLX TO MARILY      METABOLIC PANEL, COMPREHENSIVE      URINALYSIS W/ RFLX MICROSCOPIC   2. Screening for cardiovascular condition Z13.6 V81.2 LIPID PANEL   3. Urinary, incontinence, stress female N39.3 625.6 CULTURE, URINE      oxybutynin (DITROPAN) 5 mg tablet   4. Upper respiratory tract infection, unspecified type J06.9 465.9    5. Advance care planning Z71.89 V65.49          Follow-up Disposition:  Return in about 4 weeks (around 1/9/2018) for follow-up. I have discussed the diagnosis with the patient and the intended plan as seen in the above orders. The patient has received an after-visit summary and questions were answered concerning future plans. I have discussed medication side effects and warnings with the patient as well. I have reviewed the plan of care with the patient, accepted their input and they are in agreement with the treatment goals.        Latosha Goodson MD  December 12, 2017 temperature, vibration and proprioception throughout upper and lower extremities. Negative Romberg.      Coordination: Finger-nose-finger and heel-shin intact without dysmetria. Rapid alternating movements intact/symmetric with normal speed and rhythm.     Gait: Normal, steady casual gait. Able to walk on toes, heels and tandem without difficulty.         Data: Pertinent prior to visit   Imaging:  MR Brain from 08/26/2021 -  1. No acute abnormality.  2. Scattered white matter T2 hyperintensities which presumably  represent chronic small vessel ischemic change.  3. Patchy area of T2 hyperintense signal within the left frontal white  matter measuring up to 1.3 cm. This could represent chronic small  vessel ischemic change; however, sequela of demyelination, old  infectious/inflammatory insult, or primary brain lesion cannot be  excluded. Recommend follow-up MRI in 6-12 months.  4. Paranasal sinus mucosal thickening, worse involving the bilateral  ethmoid and right frontal sinuses.     MR Cervical Spine from 09/02/2021 -   1. Solid bony fusion across the C6-C7 disc space with probable disc  spacer at that level. No spinal canal or neural foraminal narrowing at  the postoperative level.  2. Degenerative changes in the cervical spine, most pronounced at  C4-C5 and C5-C6 as detailed above.  3. Mild right neural foraminal narrowing at C5-C6. No other  significant spinal canal or neural foraminal narrowings.     Personally reviewed and agree with above impression         Assessment and Plan:   Assessment:  Carolyn Saldivar is a 61 year old female who presents today for evaluation of abnormal MRI brain and headaches. Patient had frequent headaches from spring 2021 to fall 2021. These improved dramatically after cervical epidural cortisone injection in fall 2021. Description of headaches is suggestive of tension-type headaches. We discussed if symptoms return patient could discuss repeat cortisone injection with pain doctor or  physical therapy referral could be considered.     MRI brain was reviewed and shows moderate sized non specific left frontal T2 hyper intensity without abnormal contrast enhancement or DWI changes. Appearance of lesion is suggestive of a chronic lesion. A remote demyelinating lesion is possible. Patient does not have a history of suggestive of prior symptoms related to this lesion. We discussed repeating imaging to ensure stability of lesion.        Plan:  - Call with worsening of headaches  - Repeat MRI brain with and without contrast    Follow up in Neurology clinic as needed should new concerns arise.    Meliton Thompson MD   of Neurology  [facility]      The total time of this encounter today amounted to 67 minutes. This time included time spent with the patient, prep work, ordering tests, and performing post visit documentation.

## 2022-03-01 ENCOUNTER — MYC MEDICAL ADVICE (OUTPATIENT)
Dept: FAMILY MEDICINE | Facility: OTHER | Age: 62
End: 2022-03-01
Payer: COMMERCIAL

## 2022-03-01 DIAGNOSIS — J30.1 SEASONAL ALLERGIC RHINITIS DUE TO POLLEN: ICD-10-CM

## 2022-03-01 NOTE — TELEPHONE ENCOUNTER
[employer] sent.     SHERRELL Yu CNP  Questions or concerns please feel free to send me a [employer] message or call me  Phone : [phone]

## 2022-03-21 ENCOUNTER — TELEPHONE (OUTPATIENT)
Dept: PULMONOLOGY | Facility: CLINIC | Age: 62
End: 2022-03-21
Payer: COMMERCIAL

## 2022-03-21 DIAGNOSIS — F41.1 GENERALIZED ANXIETY DISORDER: ICD-10-CM

## 2022-03-21 DIAGNOSIS — F32.1 MODERATE MAJOR DEPRESSION (H): ICD-10-CM

## 2022-03-21 RX ORDER — FLUOXETINE 10 MG/1
10 CAPSULE ORAL DAILY
Qty: 90 CAPSULE | Refills: 1 | Status: SHIPPED | OUTPATIENT
Start: 2022-03-21 | End: 2022-06-02

## 2022-03-21 NOTE — TELEPHONE ENCOUNTER
Refill given.       SHERRELL Yu CNP  Questions or concerns please feel free to send me a [employer] message or call me  Phone : [phone]

## 2022-03-21 NOTE — TELEPHONE ENCOUNTER
Per Dr. Forte's LOV notes, patient needs to complete a sleep referral and PFT. Provided patient with sleep scheduling phone number to set up these appointments.     Andreea Peterson RN on 3/21/2022 at 2:48 PM

## 2022-03-21 NOTE — TELEPHONE ENCOUNTER
Reason for Call:  Other referral    Detailed comments: patient called and cannot remember what she was being referred for about  Febuary. 2022 from  PULMONOLGY [facility].  Platelet Function Test?  Pulmonology?  Please contact patient.  Thank you.    Phone Number Patient can be reached at: Home number on file [phone] (home)    Best Time: any    Can we leave a detailed message on this number? YES    Call taken on 3/21/2022 at 1:11 PM by Janet Blanco

## 2022-04-19 DIAGNOSIS — I10 BENIGN ESSENTIAL HYPERTENSION: ICD-10-CM

## 2022-04-21 RX ORDER — LOSARTAN POTASSIUM 25 MG/1
25 TABLET ORAL DAILY
Qty: 90 TABLET | Refills: 0 | Status: SHIPPED | OUTPATIENT
Start: 2022-04-21 | End: 2022-06-02

## 2022-04-21 NOTE — TELEPHONE ENCOUNTER
"Pending Prescriptions:                       Disp   Refills    losartan (COZAAR) 25 MG tablet             90 tab*0        Sig: Take 1 tablet (25 mg) by mouth daily    Routing refill request to provider for review/approval because:  Labs out of range:  BP    Requested Prescriptions   Pending Prescriptions Disp Refills    losartan (COZAAR) 25 MG tablet 90 tablet 0     Sig: Take 1 tablet (25 mg) by mouth daily        Angiotensin-II Receptors Failed - 4/19/2022 10:31 AM        Failed - Last blood pressure under 140/90 in past 12 months       BP Readings from Last 3 Encounters:   02/23/22 (!) 141/80   02/23/22 114/80   09/28/21 (!) 155/95                 Passed - Recent (12 mo) or future (30 days) visit within the authorizing provider's specialty     Patient has had an office visit with the authorizing provider or a provider within the authorizing providers department within the previous 12 mos or has a future within next 30 days. See \"Patient Info\" tab in inbasket, or \"Choose Columns\" in Meds & Orders section of the refill encounter.              Passed - Medication is active on med list        Passed - Patient is age 18 or older        Passed - No active pregnancy on record        Passed - Normal serum creatinine on file in past 12 months     Recent Labs   Lab Test 08/23/21  1210   CR 0.83       Ok to refill medication if creatinine is low          Passed - Normal serum potassium on file in past 12 months       Recent Labs   Lab Test 08/23/21  1210   POTASSIUM 4.8                    Passed - No positive pregnancy test in past 12 months                  "

## 2022-04-21 NOTE — TELEPHONE ENCOUNTER
Patient is due for visit, I gave a hakan. She needs to schedule for annual exam and med check.       SHERRELL Yu CNP  Questions or concerns please feel free to send me a Forsake message or call me  Phone : [phone]

## 2022-05-02 ENCOUNTER — TELEPHONE (OUTPATIENT)
Dept: FAMILY MEDICINE | Facility: OTHER | Age: 62
End: 2022-05-02
Payer: COMMERCIAL

## 2022-05-02 NOTE — TELEPHONE ENCOUNTER
Please call patient let her know that she was not scheduled for the correct amount of time for her preventative visit. I did move her up so that we would have the complete physical time frame, she should get a message via Wix for this change. If this does not work please let me know.          Jun 02, 2022 10:40 AM  (Arrive by 10:20 AM)  Adult Preventative Visit with SHERRELL Maxwell CNP  [facility] ([facility] ) [phone]            SHERRELL Yu CNP  Questions or concerns please feel free to send me a Wix message or call me  Phone : [phone]

## 2022-05-17 ASSESSMENT — SLEEP AND FATIGUE QUESTIONNAIRES
HOW LIKELY ARE YOU TO NOD OFF OR FALL ASLEEP WHILE SITTING AND TALKING TO SOMEONE: WOULD NEVER DOZE
HOW LIKELY ARE YOU TO NOD OFF OR FALL ASLEEP WHILE SITTING INACTIVE IN A PUBLIC PLACE: SLIGHT CHANCE OF DOZING
HOW LIKELY ARE YOU TO NOD OFF OR FALL ASLEEP WHILE WATCHING TV: SLIGHT CHANCE OF DOZING
HOW LIKELY ARE YOU TO NOD OFF OR FALL ASLEEP IN A CAR, WHILE STOPPED FOR A FEW MINUTES IN TRAFFIC: WOULD NEVER DOZE
HOW LIKELY ARE YOU TO NOD OFF OR FALL ASLEEP WHILE SITTING AND READING: MODERATE CHANCE OF DOZING
HOW LIKELY ARE YOU TO NOD OFF OR FALL ASLEEP WHILE SITTING QUIETLY AFTER LUNCH WITHOUT ALCOHOL: SLIGHT CHANCE OF DOZING
HOW LIKELY ARE YOU TO NOD OFF OR FALL ASLEEP WHILE LYING DOWN TO REST IN THE AFTERNOON WHEN CIRCUMSTANCES PERMIT: SLIGHT CHANCE OF DOZING
HOW LIKELY ARE YOU TO NOD OFF OR FALL ASLEEP WHEN YOU ARE A PASSENGER IN A CAR FOR AN HOUR WITHOUT A BREAK: SLIGHT CHANCE OF DOZING

## 2022-05-18 ENCOUNTER — VIRTUAL VISIT (OUTPATIENT)
Dept: SLEEP MEDICINE | Facility: CLINIC | Age: 62
End: 2022-05-18
Payer: COMMERCIAL

## 2022-05-18 VITALS — BODY MASS INDEX: 33.31 KG/M2 | HEIGHT: 62 IN | WEIGHT: 181 LBS

## 2022-05-18 DIAGNOSIS — G47.33 OSA ON CPAP: Primary | ICD-10-CM

## 2022-05-18 DIAGNOSIS — E66.811 OBESITY (BMI 30.0-34.9): ICD-10-CM

## 2022-05-18 PROCEDURE — 99204 OFFICE O/P NEW MOD 45 MIN: CPT | Mod: 95 | Performed by: INTERNAL MEDICINE

## 2022-05-18 NOTE — PROGRESS NOTES
Carolyn is a 61 year old who is being evaluated via a billable video visit.      How would you like to obtain your AVS? MyChart  If the video visit is dropped, the invitation should be resent by: Text to cell phone: [phone]  Will anyone else be joining your video visit? Sanjuana Graf      Video Start Time: 9:58 AM  Video-Visit Details    Type of service:  Video Visit    Video End Time:10:27 AM    Originating Location (pt. Location): Home    Distant Location (provider location):  [facility]     Platform used for Video Visit: FairSoftware     Chief complaint:  patient referred by Dr Christopher Forte MD to evaluate treatment of sleep apnea and establish care    History of Present Illness: 61-year-old female with history of asthma and is currently on CPAP for treatment of moderately severe obstructive sleep apnea.  She was diagnosed in 2014 in [place] after presenting with excessive daytime sleepiness and loud snoring.  She has been on CPAP therapy since that time.  She has the same machine as she did back then.  It is a ResMed.  She uses nasal pillow style mask.  She finds the pressures comfortable.  She is having some noise coming from the area between the mask and the tubing.  She gets her supplies online because it is much more affordable.  She uses CPAP every night.  Without CPAP she is still snores.  She finds the pressure is comfortable.  She denies symptoms of daytime sleepiness.  She is not taking naps.  Typical bedtime is around 11 to 12:30 AM with a rise time 7 AM to 8:30 AM she will take Tylenol PM most nights to help her fall asleep.  She drinks 2 cups of coffee in the morning.  She denies daytime grogginess associated with the medication.    No history of restless legs, sleepwalking, sleep talking or dream enactment behavior.    She has been having more problems with shortness of breath with activity but symptoms have actually improved a little bit lately.  She  does not need to use an inhaler at night.    Her weight has been overall stable since her sleep study.  She tends to fluctuate within a few pounds.  She has questions about travel machines as well as Inspire therapy.    She uses a UV light sanitizing box to clean her CPAP supplies.      [place] Sleepiness Scale   Sitting and reading: Moderate chance of dozing   Watching TV: Slight chance of dozing   Sitting, inactive in a public place (e.g. a theatre or a meeting): Slight chance of dozing   As a passenger in a car for an hour without a break: Slight chance of dozing   Lying down to rest in the afternoon when circumstances permit: Slight chance of dozing   Sitting and talking to someone: Would never doze   Sitting quietly after a lunch without alcohol: Slight chance of dozing   In a car, while stopped for a few minutes in traffic: Would never doze   Total score - M[place]ammon: 7   (Less than 10 normal)    Insomnia Severity Scale  INDIGO Total Score: 8  Total score categories:  0-7 = No clinically significant insomnia   8-14 = Subthreshold insomnia   15-21 = Clinical insomnia (moderate severity)  22-28 = Clinical insomnia (severe)          Past Medical History:   Diagnosis Date     Anxiety      Depressive disorder      Hypertension      Sleep apnea        Allergies   Allergen Reactions     Cats      Dogs      Seasonal Allergies Other (See Comments)     Birch trees, maple trees, aramis grass, orchard grass, mugwart, lamb's quarter, dust mites       Current Outpatient Medications   Medication     albuterol (PROAIR HFA) 108 (90 Base) MCG/ACT inhaler     atorvastatin (LIPITOR) 10 MG tablet     cyclobenzaprine (FLEXERIL) 5 MG tablet     FLUoxetine (PROZAC) 10 MG capsule     fluticasone (FLONASE) 50 MCG/ACT nasal spray     fluticasone (FLOVENT HFA) 110 MCG/ACT inhaler     loratadine-pseudoePHEDrine (CLARITIN-D 12-HOUR) 5-120 MG 12 hr tablet     loratadine-pseudoePHEDrine (CLARITIN-D 24-HOUR)  MG 24 hr tablet     losartan  "(COZAAR) 25 MG tablet     Omega-3 Fatty Acids (FISH OIL-OMEGA-3 FATTY ACID) 1000 MG DR capsule     No current facility-administered medications for this visit.       Social History     Socioeconomic History     Marital status:      Spouse name: Not on file     Number of children: Not on file     Years of education: Not on file     Highest education level: Not on file   Occupational History     Not on file   Tobacco Use     Smoking status: Former Smoker     Packs/day: 0.50     Years: 15.00     Pack years: 7.50     Types: Cigarettes     Start date:      Quit date:      Years since quittin.3     Smokeless tobacco: Never Used   Vaping Use     Vaping Use: Never used   Substance and Sexual Activity     Alcohol use: Yes     Comment: occasionally     Drug use: Never     Sexual activity: Not Currently   Other Topics Concern     Not on file   Social History Narrative     Not on file     Social Determinants of Health     Financial Resource Strain: Not on file   Food Insecurity: Not on file   Transportation Needs: Not on file   Physical Activity: Not on file   Stress: Not on file   Social Connections: Not on file   Intimate Partner Violence: Not on file   Housing Stability: Not on file       Family History   Problem Relation Age of Onset     Heart Failure Mother      Coronary Artery Disease Mother      Valvular heart disease Sister          EXAM:  Ht 1.567 m (5' 1.7\")   Wt 82.1 kg (181 lb)   LMP  (LMP Unknown)   BMI 33.43 kg/m    GENERAL: Alert and no distress  EYES: Eyes grossly normal to inspection.  No discharge or erythema, or obvious scleral/conjunctival abnormalities.  RESP: No audible wheeze, cough, or visible cyanosis.  No visible retractions or increased work of breathing.    SKIN: Visible skin clear. No significant rash, abnormal pigmentation or lesions.  NEURO: Cranial nerves grossly intact.  Mentation and speech appropriate for age.  PSYCH: Mentation appears normal, affect normal, judgement " and insight intact, normal speech and appearance well-groomed.       PSG 8/4/2014 [facility]  ESS 18  Weight 177 lbs, BMI 33.4  AHI 26, RDI31.2, Lowest O2 sat 91%  Supine AHI 37.7, Non-supine AHI 13.19    PAP download Not available    TSH   Date Value Ref Range Status   08/10/2020 2.51 0.40 - 4.00 mU/L Final         ASSESSMENT:  61-year-old female with history of allergies and asthma, obesity and moderate obstructive sleep apnea.  She is getting good clinical benefit and reports meeting compliance goals however download of data is not available at this time to confirm.  Due to her good clinical benefit she is not likely a candidate for UAS therapy (Inspire) therapy.  Ongoing treatment of obstructive sleep apnea is medically indicated.    PLAN:  Requested a download from her machine.  We will try to obtain from her original DME if the modem is functioning, otherwise she will need to stop into clinic for manual download.  Patient is going to think about what she wants to do with a travel machine.  She will contact me for prescription when needed.  I did inform her of the difficulty in getting machines these days due to the shortage of machines related to the Respironics recall.  Patient is encouraged to continue to clean her supplies rather than just sanitize them with a UV light .  She may need a better mask and tubing set up to minimize leak.  Avoid weight gain.  Follow-up in 1 year.      55 minutes spent on the date of the encounter doing chart review, history and exam, documentation and further activities per the note    Khushi Benson M.D.  Pulmonary/Critical Care/Sleep Medicine    [facility]   Floor 1, [address]   [address] [place], [place] [address]. [place] [address]   Appointments: [phone]    The above note was dictated using voice recognition software and may include typographical errors. Please contact the  author for any clarifications.

## 2022-05-18 NOTE — LETTER
5/18/2022         RE: Carolyn Saldivar  [address]  [place] [facility] [address]        Dear Colleague,    Thank you for referring your patient, Carolyn Saldiavr, to the [facility]. Please see a copy of my visit note below.    Carolyn is a 61 year old who is being evaluated via a billable video visit.      How would you like to obtain your AVS? MyChart  If the video visit is dropped, the invitation should be resent by: Text to cell phone: [phone]  Will anyone else be joining your video visit? Sanjuana Alicealidia Graf      Video Start Time: 9:58 AM  Video-Visit Details    Type of service:  Video Visit    Video End Time:10:27 AM    Originating Location (pt. Location): Home    Distant Location (provider location):  [facility]     Platform used for Video Visit: Ensighten     Chief complaint:  patient referred by Dr Christopher Forte MD to evaluate treatment of sleep apnea and establish care    History of Present Illness: 61-year-old female with history of asthma and is currently on CPAP for treatment of moderately severe obstructive sleep apnea.  She was diagnosed in 2014 in [place] after presenting with excessive daytime sleepiness and loud snoring.  She has been on CPAP therapy since that time.  She has the same machine as she did back then.  It is a ResMed.  She uses nasal pillow style mask.  She finds the pressures comfortable.  She is having some noise coming from the area between the mask and the tubing.  She gets her supplies online because it is much more affordable.  She uses CPAP every night.  Without CPAP she is still snores.  She finds the pressure is comfortable.  She denies symptoms of daytime sleepiness.  She is not taking naps.  Typical bedtime is around 11 to 12:30 AM with a rise time 7 AM to 8:30 AM she will take Tylenol PM most nights to help her fall asleep.  She drinks 2 cups of coffee in the morning.  She denies daytime  grogginess associated with the medication.    No history of restless legs, sleepwalking, sleep talking or dream enactment behavior.    She has been having more problems with shortness of breath with activity but symptoms have actually improved a little bit lately.  She does not need to use an inhaler at night.    Her weight has been overall stable since her sleep study.  She tends to fluctuate within a few pounds.  She has questions about travel machines as well as Inspire therapy.    She uses a UV light sanitizing box to clean her CPAP supplies.      [place] Sleepiness Scale   Sitting and reading: Moderate chance of dozing   Watching TV: Slight chance of dozing   Sitting, inactive in a public place (e.g. a theatre or a meeting): Slight chance of dozing   As a passenger in a car for an hour without a break: Slight chance of dozing   Lying down to rest in the afternoon when circumstances permit: Slight chance of dozing   Sitting and talking to someone: Would never doze   Sitting quietly after a lunch without alcohol: Slight chance of dozing   In a car, while stopped for a few minutes in traffic: Would never doze   Total score - [place]: 7   (Less than 10 normal)    Insomnia Severity Scale  INDIGO Total Score: 8  Total score categories:  0-7 = No clinically significant insomnia   8-14 = Subthreshold insomnia   15-21 = Clinical insomnia (moderate severity)  22-28 = Clinical insomnia (severe)          Past Medical History:   Diagnosis Date     Anxiety      Depressive disorder      Hypertension      Sleep apnea        Allergies   Allergen Reactions     Cats      Dogs      Seasonal Allergies Other (See Comments)     Birch trees, maple trees, aramis grass, orchard grass, mugwart, lamb's quarter, dust mites       Current Outpatient Medications   Medication     albuterol (PROAIR HFA) 108 (90 Base) MCG/ACT inhaler     atorvastatin (LIPITOR) 10 MG tablet     cyclobenzaprine (FLEXERIL) 5 MG tablet     FLUoxetine (PROZAC) 10 MG  "capsule     fluticasone (FLONASE) 50 MCG/ACT nasal spray     fluticasone (FLOVENT HFA) 110 MCG/ACT inhaler     loratadine-pseudoePHEDrine (CLARITIN-D 12-HOUR) 5-120 MG 12 hr tablet     loratadine-pseudoePHEDrine (CLARITIN-D 24-HOUR)  MG 24 hr tablet     losartan (COZAAR) 25 MG tablet     Omega-3 Fatty Acids (FISH OIL-OMEGA-3 FATTY ACID) 1000 MG DR capsule     No current facility-administered medications for this visit.       Social History     Socioeconomic History     Marital status:      Spouse name: Not on file     Number of children: Not on file     Years of education: Not on file     Highest education level: Not on file   Occupational History     Not on file   Tobacco Use     Smoking status: Former Smoker     Packs/day: 0.50     Years: 15.00     Pack years: 7.50     Types: Cigarettes     Start date:      Quit date:      Years since quittin.3     Smokeless tobacco: Never Used   Vaping Use     Vaping Use: Never used   Substance and Sexual Activity     Alcohol use: Yes     Comment: occasionally     Drug use: Never     Sexual activity: Not Currently   Other Topics Concern     Not on file   Social History Narrative     Not on file     Social Determinants of Health     Financial Resource Strain: Not on file   Food Insecurity: Not on file   Transportation Needs: Not on file   Physical Activity: Not on file   Stress: Not on file   Social Connections: Not on file   Intimate Partner Violence: Not on file   Housing Stability: Not on file       Family History   Problem Relation Age of Onset     Heart Failure Mother      Coronary Artery Disease Mother      Valvular heart disease Sister          EXAM:  Ht 1.567 m (5' 1.7\")   Wt 82.1 kg (181 lb)   LMP  (LMP Unknown)   BMI 33.43 kg/m    GENERAL: Alert and no distress  EYES: Eyes grossly normal to inspection.  No discharge or erythema, or obvious scleral/conjunctival abnormalities.  RESP: No audible wheeze, cough, or visible cyanosis.  No visible " retractions or increased work of breathing.    SKIN: Visible skin clear. No significant rash, abnormal pigmentation or lesions.  NEURO: Cranial nerves grossly intact.  Mentation and speech appropriate for age.  PSYCH: Mentation appears normal, affect normal, judgement and insight intact, normal speech and appearance well-groomed.       PSG 8/4/2014 [facility]  ESS 18  Weight 177 lbs, BMI 33.4  AHI 26, RDI31.2, Lowest O2 sat 91%  Supine AHI 37.7, Non-supine AHI 13.19    PAP download Not available    TSH   Date Value Ref Range Status   08/10/2020 2.51 0.40 - 4.00 mU/L Final         ASSESSMENT:  61-year-old female with history of allergies and asthma, obesity and moderate obstructive sleep apnea.  She is getting good clinical benefit and reports meeting compliance goals however download of data is not available at this time to confirm.  Due to her good clinical benefit she is not likely a candidate for UAS therapy (Inspire) therapy.  Ongoing treatment of obstructive sleep apnea is medically indicated.    PLAN:  Requested a download from her machine.  We will try to obtain from her original DME if the modem is functioning, otherwise she will need to stop into clinic for manual download.  Patient is going to think about what she wants to do with a travel machine.  She will contact me for prescription when needed.  I did inform her of the difficulty in getting machines these days due to the shortage of machines related to the Respironics recall.  Patient is encouraged to continue to clean her supplies rather than just sanitize them with a UV light .  She may need a better mask and tubing set up to minimize leak.  Avoid weight gain.  Follow-up in 1 year.      55 minutes spent on the date of the encounter doing chart review, history and exam, documentation and further activities per the note    Khushi Benson M.D.  Pulmonary/Critical Care/Sleep Medicine    [facility]  [facility]   Floor 1, [address]   [address] [place] [address]   Appointments: [phone]    The above note was dictated using voice recognition software and may include typographical errors. Please contact the author for any clarifications.                Again, thank you for allowing me to participate in the care of your patient.        Sincerely,        Khushi Benson MD

## 2022-05-18 NOTE — PATIENT INSTRUCTIONS
For general sleep health questions:   http://sleepeducation.org    For tips about PAP and COVID-19:  https://www.thoracic.org/patients/patient-resources/resources/covid-19-and-home-pap-therapy.pdf    For general info about COVID-19 including vaccines:  https://Quietly.org/covid19      Continue PAP therapy every night, for all hours that you are sleeping (including naps.)  As always, try to get at least 8 hours of sleep or more each day, keep a regular sleep schedule, and avoid sleep deprivation. Avoid alcohol.  Reasons that you might need a change to your pressure therapy would be weight gain or loss, waking having inadvertently removed your PAP overnight, having previously felt refreshed by sleep with CPAP use and now waking un-refreshed, and return of daytime sleepiness. Also, the development of new medical problems  (such as heart failure, stroke, medications such as narcotics) can sometimes affect breathing at night and change your PAP therapy needs.  Please bring PAP with you if you are hospitalized.  If anticipating surgery be sure to discuss with your surgeon that you have sleep apnea and use PAP therapy.    Maintain your equipment as recommended which includes routine cleaning and replacement of supplies.      Call DME for any questions regarding supplies or maintenance.        Do not drive on engage in potentially dangerous activities if feeling sleepy.  Please follow up in sleep clinic again in 12 months.        Tips for your PAP use-    Mask fitting tips  Mask fitting exercise:    To improve your mask seal and your mobility at night, put mask on and secure in place.  Lie down in bed with full pressure and roll to one side, adjust headgear while in that position to eliminate any leaks. Repeat process rolling to other side.     The mask seal does not have to be perfect:   CPAP machines are designed to make up for small leaks. However, you will not tolerate leaks blowing in your eyes so you will need  to adjust.   Any leak should only be near or at the bottom of the mask.  We expect your mask to leak slightly at night.    Do not over-tighten the headgear straps, tighter IS NOT better, we expect minimal leak.    First try re-positioning the mask or headgear before tightening the headgear straps.  Mask leaks are expected due to changing sleeping positions. Try pulling the mask away from your skin allowing the cushion to re-inflate will minimize the leak.  If you struggle for a good fit, try turning the CPAP off and then readjust the mask by pulling it away from your face and then turning back on the CPAP.        Humidifier tips  Humidifiers can be adjusted to increase or decrease the amount of moisture according to your comfort level. You may need to adjust this frequently at first, but then might only change it with seasonal weather changes.     Try INCREASING the humidity if:  You experience a dry, irritated nasal passage or throat.  You have a runny, drippy nose or sneezing fits after using CPAP.  You experience nasal congestion during or after CPAP use.    Try DECREASING the humidity if:  You have excessive condensation or  rain out  in the tubing or mask.  Otherwise keep the tubing warm during the night by running it underneath the blankets or pillow.      Clinic visit after initial PAP set-up   Bring your equipment with you to your 5-8 week follow up clinic visit.  We will be extracting your data from the machine if not available from the cloud based modem.        Travel  Always take your equipment with you when you travel.  If you fly with your equipment bring it on with you as a carry on.  Medical equipment does not count as a carry on.    If you travel international the machines take 110-240v.  The only adapter needed is the adapter that will fit into the receptacle (outlet).    You may also want to bring an extension cord as many hotel rooms have limited outlets at the bedside.  Do not travel with water  in your humidifier chamber.     Cleaning and Maintenance Guidelines    Equipment Frequency Cleaning Method   Mask First Day    Daily      Weekly Soak mask in hot soapy water for 30 minutes, rinse and air dry.  Wipe nasal cushion with a hot soapy (Ivory, baby shampoo) cloth and rinse.  Baby wipes may also be used.  Do not use anti-bacterial soaps,Juanis  liquid soap, rubbing alcohol, bleach or ammonia.  Wash frame in hot soapy water (Ivory, baby shampoo) rinse and let air dry   Headgear Biweekly Wash in hot soapy water, rinse and air dry   Reusable Gray Filter Weekly Wash in hot soapy water, rinse, put in towel squeeze moisture out, let air dry   Disposable White Filter Check Weekly Replace when brown or gray in color; at least every 2 to 3 months   Humidifier Chamber Daily    Weekly Empty distilled water from humidifier and let air dry    Hand wash in hot soapy water, rinse and air dry   Tubing Weekly Wash in hot soapy water, rinse and let air dry   Mask, Tubing and Humidifier Chamber As needed Disinfect: Soak in 1 part distilled white vinegar to 3 parts hot water for 30 minutes, rinse well and air dry  Not the material headgear        MASK AND SUPPLY REORDERING and EQUIPMENT NEEDS through your DME and per your insurance  Reminder: Most insurance companies will allow for a new mask, headgear, tubing, and reusable gray filter every six months.  Disposable white ultra-fine filters are covered monthly.      HOME AND SAFETY INSTRUCTIONS  Do not use frayed or cracked electrical cords, multi plug adaptors, or switched receptacles  Do not immerse electrical equipment into water  Assure that electrical cords do not become a tripping hazard

## 2022-05-18 NOTE — NURSING NOTE
Spoke with Emanuel at [facility], Carolyn has a S9 Resmed, needs a manual download.  Spoke with Carolyn, since she will be at [facility] on Monday 5/23/22, recommended she take machine to our sleep Clinic at [place] location for a manual download.  Sent the addresses of all our locations to her via [employer].    Asked Carolyn to call us if she has any questions or concerns.     Prepped [employer] reminder for patient to schedule a return visit in one year.      No further action needed.

## 2022-05-23 ENCOUNTER — ANCILLARY PROCEDURE (OUTPATIENT)
Dept: MRI IMAGING | Facility: CLINIC | Age: 62
End: 2022-05-23
Attending: INTERNAL MEDICINE
Payer: COMMERCIAL

## 2022-05-23 DIAGNOSIS — G44.209 TENSION HEADACHE: ICD-10-CM

## 2022-05-23 DIAGNOSIS — R93.0 ABNORMAL MRI OF HEAD: ICD-10-CM

## 2022-05-23 PROCEDURE — 70553 MRI BRAIN STEM W/O & W/DYE: CPT | Mod: GC | Performed by: RADIOLOGY

## 2022-05-23 PROCEDURE — A9585 GADOBUTROL INJECTION: HCPCS | Mod: JW | Performed by: RADIOLOGY

## 2022-05-23 RX ORDER — GADOBUTROL 604.72 MG/ML
10 INJECTION INTRAVENOUS ONCE
Status: COMPLETED | OUTPATIENT
Start: 2022-05-23 | End: 2022-05-23

## 2022-05-23 RX ADMIN — GADOBUTROL 8 ML: 604.72 INJECTION INTRAVENOUS at 10:46

## 2022-06-02 ENCOUNTER — OFFICE VISIT (OUTPATIENT)
Dept: FAMILY MEDICINE | Facility: OTHER | Age: 62
End: 2022-06-02
Payer: COMMERCIAL

## 2022-06-02 VITALS
OXYGEN SATURATION: 98 % | SYSTOLIC BLOOD PRESSURE: 108 MMHG | RESPIRATION RATE: 20 BRPM | WEIGHT: 169.25 LBS | TEMPERATURE: 97.3 F | HEART RATE: 66 BPM | DIASTOLIC BLOOD PRESSURE: 76 MMHG | BODY MASS INDEX: 31.15 KG/M2 | HEIGHT: 62 IN

## 2022-06-02 DIAGNOSIS — E78.5 HYPERLIPIDEMIA LDL GOAL <130: ICD-10-CM

## 2022-06-02 DIAGNOSIS — I10 BENIGN ESSENTIAL HYPERTENSION: ICD-10-CM

## 2022-06-02 DIAGNOSIS — Z23 HIGH PRIORITY FOR 2019-NCOV VACCINE: ICD-10-CM

## 2022-06-02 DIAGNOSIS — E78.1 HYPERTRIGLYCERIDEMIA: ICD-10-CM

## 2022-06-02 DIAGNOSIS — F32.1 MODERATE MAJOR DEPRESSION (H): Primary | ICD-10-CM

## 2022-06-02 DIAGNOSIS — J45.40 MODERATE PERSISTENT ASTHMA WITHOUT COMPLICATION: ICD-10-CM

## 2022-06-02 DIAGNOSIS — F41.1 GENERALIZED ANXIETY DISORDER: ICD-10-CM

## 2022-06-02 PROBLEM — F32.9 MAJOR DEPRESSION: Status: RESOLVED | Noted: 2020-03-31 | Resolved: 2022-06-02

## 2022-06-02 PROCEDURE — 99214 OFFICE O/P EST MOD 30 MIN: CPT | Mod: 25 | Performed by: NURSE PRACTITIONER

## 2022-06-02 PROCEDURE — 91305 COVID-19,PF,PFIZER (12+ YRS): CPT | Performed by: NURSE PRACTITIONER

## 2022-06-02 PROCEDURE — 0054A COVID-19,PF,PFIZER (12+ YRS): CPT | Performed by: NURSE PRACTITIONER

## 2022-06-02 RX ORDER — FLUTICASONE PROPIONATE 110 UG/1
1 AEROSOL, METERED RESPIRATORY (INHALATION) 2 TIMES DAILY
Qty: 18 G | Refills: 5 | Status: SHIPPED | OUTPATIENT
Start: 2022-06-02

## 2022-06-02 RX ORDER — LOSARTAN POTASSIUM 25 MG/1
25 TABLET ORAL DAILY
Qty: 90 TABLET | Refills: 2 | Status: SHIPPED | OUTPATIENT
Start: 2022-06-02

## 2022-06-02 RX ORDER — ALBUTEROL SULFATE 90 UG/1
2 AEROSOL, METERED RESPIRATORY (INHALATION) EVERY 4 HOURS PRN
Qty: 18 G | Refills: 5 | Status: SHIPPED | OUTPATIENT
Start: 2022-06-02

## 2022-06-02 RX ORDER — FLUOXETINE 10 MG/1
10 CAPSULE ORAL DAILY
Qty: 90 CAPSULE | Refills: 2 | Status: SHIPPED | OUTPATIENT
Start: 2022-06-02

## 2022-06-02 ASSESSMENT — ASTHMA QUESTIONNAIRES
QUESTION_4 LAST FOUR WEEKS HOW OFTEN HAVE YOU USED YOUR RESCUE INHALER OR NEBULIZER MEDICATION (SUCH AS ALBUTEROL): NOT AT ALL
QUESTION_5 LAST FOUR WEEKS HOW WOULD YOU RATE YOUR ASTHMA CONTROL: SOMEWHAT CONTROLLED
QUESTION_3 LAST FOUR WEEKS HOW OFTEN DID YOUR ASTHMA SYMPTOMS (WHEEZING, COUGHING, SHORTNESS OF BREATH, CHEST TIGHTNESS OR PAIN) WAKE YOU UP AT NIGHT OR EARLIER THAN USUAL IN THE MORNING: NOT AT ALL
ACT_TOTALSCORE: 17
QUESTION_1 LAST FOUR WEEKS HOW MUCH OF THE TIME DID YOUR ASTHMA KEEP YOU FROM GETTING AS MUCH DONE AT WORK, SCHOOL OR AT HOME: SOME OF THE TIME
ACT_TOTALSCORE: 17
QUESTION_2 LAST FOUR WEEKS HOW OFTEN HAVE YOU HAD SHORTNESS OF BREATH: MORE THAN ONCE A DAY

## 2022-06-02 ASSESSMENT — PATIENT HEALTH QUESTIONNAIRE - PHQ9
SUM OF ALL RESPONSES TO PHQ QUESTIONS 1-9: 11
SUM OF ALL RESPONSES TO PHQ QUESTIONS 1-9: 11
10. IF YOU CHECKED OFF ANY PROBLEMS, HOW DIFFICULT HAVE THESE PROBLEMS MADE IT FOR YOU TO DO YOUR WORK, TAKE CARE OF THINGS AT HOME, OR GET ALONG WITH OTHER PEOPLE: SOMEWHAT DIFFICULT

## 2022-06-02 ASSESSMENT — PAIN SCALES - GENERAL: PAINLEVEL: NO PAIN (0)

## 2022-06-02 NOTE — PROGRESS NOTES
Assessment & Plan     Moderate major depression (H)  PHQ 9 was elevated today. 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 Declines need for increase in medication. Notes that she feels her medications do not need to be increased.   - Follow up in September with physical.   - FLUoxetine (PROZAC) 10 MG capsule; Take 1 capsule (10 mg) by mouth daily    Benign essential hypertension  Stable   - losartan (COZAAR) 25 MG tablet; Take 1 tablet (25 mg) by mouth daily    Hypertriglyceridemia  Stable   Will do labs at her follow up in September     Hyperlipidemia LDL goal <130  Stable   Labs in September     Generalized anxiety disorder  Stable   - FLUoxetine (PROZAC) 10 MG capsule; Take 1 capsule (10 mg) by mouth daily    Moderate persistent asthma without complication  Unstable with current allergies and winter  - Recheck ACT in September   - fluticasone (FLOVENT HFA) 110 MCG/ACT inhaler; Inhale 1 puff into the lungs 2 times daily  - albuterol (PROAIR HFA) 108 (90 Base) MCG/ACT inhaler; Inhale 2 puffs into the lungs every 4 hours as needed for shortness of breath / dyspnea    High priority for 2019-nCoV vaccine    - COVID-19,PF,PFIZER (12+ Yrs GRAY LABEL)    }  Depression Screening Follow Up    PHQ 6/2/2022   PHQ-9 Total Score 11   Q9: Thoughts of better off dead/self-harm past 2 weeks Several days   F/U: Thoughts of suicide or self-harm No   F/U: Safety concerns No     Last PHQ-9 6/2/2022   1.  Little interest or pleasure in doing things 1   2.  Feeling down, depressed, or hopeless 1   3.  Trouble falling or staying asleep, or sleeping too much 1   4.  Feeling tired or having little energy 1   5.  Poor appetite or overeating 2   6.  Feeling bad about yourself 2   7.  Trouble concentrating 2   8.  Moving slowly or restless 0   Q9: Thoughts of better off dead/self-harm past 2 weeks 1   PHQ-9 Total Score 11   Difficulty at work, home, or  with people -   In the past two weeks have you had thoughts of suicide or self harm? No   Do you have concerns about your personal safety or the safety of others? No         No flowsheet data found.      There are no Patient Instructions on file for this visit.    Return in about 2 months (around 8/2/2022).    SHERRELL Yu CNP  [facility] DENICE Leon is a 61 year old who presents for the following health issues     History of Present Illness       Reason for visit:  Med check    She eats 2-3 servings of fruits and vegetables daily.She consumes 1 sweetened beverage(s) daily.She exercises with enough effort to increase her heart rate 9 or less minutes per day.  She exercises with enough effort to increase her heart rate 3 or less days per week.   She is taking medications regularly.    Today's PHQ-9         PHQ-9 Total Score: 11    PHQ-9 Q9 Thoughts of better off dead/self-harm past 2 weeks :   Several days  Thoughts of suicide or self harm: (P) No  Self-harm Plan:     Self-harm Action:       Safety concerns for self or others: (P) No    How difficult have these problems made it for you to do your work, take care of things at home, or get along with other people: Somewhat difficult     1    PHQ 8/10/2020 4/13/2021 6/2/2022   PHQ-9 Total Score 7 8 11   Q9: Thoughts of better off dead/self-harm past 2 weeks Several days Several days Several days   F/U: Thoughts of suicide or self-harm - No No   F/U: Safety concerns - No No       Asthma Follow-Up    Was ACT completed today?    Yes    ACT Total Scores 6/2/2022   ACT TOTAL SCORE (Goal Greater than or Equal to 20) 17   In the past 12 months, how many times did you visit the emergency room for your asthma without being admitted to the hospital? 0   In the past 12 months, how many times were you hospitalized overnight because of your asthma? 0     u miss taking your asthma controller medication?  0    Please describe any recent  triggers for your asthma: Patient is unaware of triggers    Have you had any Emergency Room Visits, Urgent Care Visits, or Hospital Admissions since your last office visit?  No    Depression Followup    How are you doing with your depression since your last visit? No change    Are you having other symptoms that might be associated with depression? No    Have you had a significant life event?  No     Are you feeling anxious or having panic attacks?   No    Do you have any concerns with your use of alcohol or other drugs? No    Social History     Tobacco Use     Smoking status: Former Smoker     Packs/day: 0.50     Years: 15.00     Pack years: 7.50     Types: Cigarettes     Start date:      Quit date:      Years since quittin.4     Smokeless tobacco: Never Used   Vaping Use     Vaping Use: Never used   Substance Use Topics     Alcohol use: Yes     Comment: occasionally     Drug use: Never     PHQ 8/10/2020 2021 2022   PHQ-9 Total Score 7 8 11   Q9: Thoughts of better off dead/self-harm past 2 weeks Several days Several days Several days   F/U: Thoughts of suicide or self-harm - No No   F/U: Safety concerns - No No     BRENDA-7 SCORE 2020 8/10/2020 2021   Total Score - - 8 (mild anxiety)   Total Score 10 10 8     Last PHQ-9 2022   1.  Little interest or pleasure in doing things 1   2.  Feeling down, depressed, or hopeless 1   3.  Trouble falling or staying asleep, or sleeping too much 1   4.  Feeling tired or having little energy 1   5.  Poor appetite or overeating 2   6.  Feeling bad about yourself 2   7.  Trouble concentrating 2   8.  Moving slowly or restless 0   Q9: Thoughts of better off dead/self-harm past 2 weeks 1   PHQ-9 Total Score 11   Difficulty at work, home, or with people -   In the past two weeks have you had thoughts of suicide or self harm? No   Do you have concerns about your personal safety or the safety of others? No     BRENDA-7  2021   1. Feeling nervous,  "anxious, or on edge 1   2. Not being able to stop or control worrying 1   3. Worrying too much about different things 2   4. Trouble relaxing 2   5. Being so restless that it is hard to sit still 1   6. Becoming easily annoyed or irritable 1   7. Feeling afraid, as if something awful might happen 0   BERNDA-7 Total Score 8   If you checked any problems, how difficult have they made it for you to do your work, take care of things at home, or get along with other people? -             Review of Systems         Objective    /76 (BP Location: Right arm, Patient Position: Sitting, Cuff Size: Adult Regular)   Pulse 66   Temp 97.3  F (36.3  C) (Temporal)   Resp 20   Ht 1.567 m (5' 1.7\")   Wt 76.8 kg (169 lb 4 oz)   LMP  (LMP Unknown)   SpO2 98%   Breastfeeding No   BMI 31.26 kg/m    Body mass index is 31.26 kg/m .  Physical Exam   GENERAL: healthy, alert and no distress  CV: regular rate and rhythm, normal S1 S2, no S3 or S4, no murmur, click or rub, no peripheral edema and peripheral pulses strong  SKIN: no suspicious lesions or rashes  NEURO: Normal strength and tone, mentation intact and speech normal  PSYCH: mentation appears normal, affect normal/bright    Diagnostic: None             "

## 2022-07-25 DIAGNOSIS — I10 BENIGN ESSENTIAL HYPERTENSION: ICD-10-CM

## 2022-07-25 RX ORDER — LOSARTAN POTASSIUM 25 MG/1
25 TABLET ORAL DAILY
Qty: 90 TABLET | Refills: 2 | OUTPATIENT
Start: 2022-07-25

## 2022-09-18 ENCOUNTER — HEALTH MAINTENANCE LETTER (OUTPATIENT)
Age: 62
End: 2022-09-18

## 2022-09-20 DIAGNOSIS — E78.5 HYPERLIPIDEMIA LDL GOAL <130: ICD-10-CM

## 2022-09-20 DIAGNOSIS — E78.1 HYPERTRIGLYCERIDEMIA: ICD-10-CM

## 2022-09-20 DIAGNOSIS — I10 BENIGN ESSENTIAL HYPERTENSION: Primary | ICD-10-CM

## 2022-09-21 RX ORDER — ATORVASTATIN CALCIUM 10 MG/1
10 TABLET, FILM COATED ORAL DAILY
Qty: 90 TABLET | Refills: 0 | Status: SHIPPED | OUTPATIENT
Start: 2022-09-21 | End: 2022-12-16

## 2022-09-21 NOTE — TELEPHONE ENCOUNTER
Refilled statin, please have patient schedule preventative visit with labs.       SHERRELL Yu CNP  Questions or concerns please feel free to send me a [employer] message or call me  Phone : [phone]

## 2022-10-20 ENCOUNTER — ANCILLARY PROCEDURE (OUTPATIENT)
Dept: MAMMOGRAPHY | Facility: CLINIC | Age: 62
End: 2022-10-20
Attending: NURSE PRACTITIONER
Payer: COMMERCIAL

## 2022-10-20 DIAGNOSIS — Z12.31 VISIT FOR SCREENING MAMMOGRAM: ICD-10-CM

## 2022-10-20 PROCEDURE — 77063 BREAST TOMOSYNTHESIS BI: CPT | Mod: GC | Performed by: STUDENT IN AN ORGANIZED HEALTH CARE EDUCATION/TRAINING PROGRAM

## 2022-10-20 PROCEDURE — 77067 SCR MAMMO BI INCL CAD: CPT | Mod: GC | Performed by: STUDENT IN AN ORGANIZED HEALTH CARE EDUCATION/TRAINING PROGRAM

## 2023-01-29 ENCOUNTER — HEALTH MAINTENANCE LETTER (OUTPATIENT)
Age: 63
End: 2023-01-29

## 2023-03-15 DIAGNOSIS — E78.5 HYPERLIPIDEMIA LDL GOAL <130: ICD-10-CM

## 2023-03-15 DIAGNOSIS — E78.1 HYPERTRIGLYCERIDEMIA: ICD-10-CM

## 2023-03-17 NOTE — TELEPHONE ENCOUNTER
"Requested Prescriptions   Pending Prescriptions Disp Refills    atorvastatin (LIPITOR) 10 MG tablet 90 tablet 0     Sig: Take 1 tablet (10 mg) by mouth daily       Statins Protocol Failed - 3/15/2023 11:03 AM        Failed - LDL on file in past 12 months     Recent Labs   Lab Test 08/23/21  1210   LDL 98             Passed - No abnormal creatine kinase in past 12 months     No lab results found.             Passed - Recent (12 mo) or future (30 days) visit within the authorizing provider's specialty     Patient has had an office visit with the authorizing provider or a provider within the authorizing providers department within the previous 12 mos or has a future within next 30 days. See \"Patient Info\" tab in inbasket, or \"Choose Columns\" in Meds & Orders section of the refill encounter.              Passed - Medication is active on med list        Passed - Patient is age 18 or older        Passed - No active pregnancy on record        Passed - No positive pregnancy test in past 12 months             "

## 2023-03-20 RX ORDER — ATORVASTATIN CALCIUM 10 MG/1
10 TABLET, FILM COATED ORAL DAILY
Qty: 90 TABLET | Refills: 0 | Status: SHIPPED | OUTPATIENT
Start: 2023-03-20 | End: 2023-06-13

## 2023-03-20 NOTE — TELEPHONE ENCOUNTER
Patient is due for annual and labs. Nalini given please schedule.       SHERRELL Yu CNP  Questions or concerns please feel free to send me a [employer] message or call me  Phone : [phone]

## 2023-06-13 DIAGNOSIS — E78.1 HYPERTRIGLYCERIDEMIA: ICD-10-CM

## 2023-06-13 DIAGNOSIS — E78.5 HYPERLIPIDEMIA LDL GOAL <130: ICD-10-CM

## 2023-06-13 RX ORDER — ATORVASTATIN CALCIUM 10 MG/1
TABLET, FILM COATED ORAL
Qty: 90 TABLET | OUTPATIENT
Start: 2023-06-13

## 2023-06-13 RX ORDER — ATORVASTATIN CALCIUM 10 MG/1
10 TABLET, FILM COATED ORAL DAILY
Qty: 30 TABLET | Refills: 0 | Status: SHIPPED | OUTPATIENT
Start: 2023-06-13

## 2023-06-13 NOTE — TELEPHONE ENCOUNTER
"Pending Prescriptions:                       Disp   Refills    atorvastatin (LIPITOR) 10 MG tablet        90 tab*0        Sig: Take 1 tablet (10 mg) by mouth daily        Routing refill request to provider for review/approval because:    Statins Protocol Failed    Rerun Protocol (6/13/2023 9:25 AM)    LDL on file in past 12 months        Recent Labs   Lab Test 08/23/21  1210   LDL 98       Recent (12 mo) or future (30 days) visit within the authorizing provider's specialty    Patient has had an office visit with the authorizing provider or a provider within the authorizing providers department within the previous 12 mos or has a future within next 30 days. See \"Patient Info\" tab in inbasket, or \"Choose Columns\" in Meds & Orders section of the refill encounter.         No abnormal creatine kinase in past 12 months    No lab results found.        Medication is active on med list      Patient is age 18 or older      No active pregnancy on record      No positive pregnancy test in past 12 months        "

## 2023-10-01 NOTE — PROGRESS NOTES
[facility]  Neurosurgery Clinic Visit      CC: neck and shoulder pain    Primary care Provider: Ronda Mena    Reason For Visit:   I was asked by Ronda Mena CNP to consult on the patient for hx of cervical spine surgery, tension headache.    HPI: Carolyn Saldivar is a 61 year old female with a history of C6-7 ACDF with new symptoms. She describes neck and bilateral shoulder pain that causes her headaches. She denies paresthesias and weakness. No bowel/bladder complaints or foot drop. She has undergone a cervical MRI. She has not had any recent injections or therapy.     Past Medical History:   Diagnosis Date     Anxiety      Depressive disorder      Hypertension      Sleep apnea        Past Medical History reviewed with patient during visit.    Past Surgical History:   Procedure Laterality Date     C CERV SPINE FUSN,ANTER,BELOW C2  06/08/2008    [facility]     COLONOSCOPY  05/18/2010     COLONOSCOPY N/A 5/25/2021    Procedure: COLONOSCOPY, WITH POLYPECTOMY;  Surgeon: Ruby Otero DO;  Location: [facility] OR     Past Surgical History reviewed with patient during visit.    Current Outpatient Medications   Medication     albuterol (PROAIR HFA) 108 (90 Base) MCG/ACT inhaler     atorvastatin (LIPITOR) 10 MG tablet     cyclobenzaprine (FLEXERIL) 5 MG tablet     FLUoxetine (PROZAC) 10 MG capsule     fluticasone (FLONASE) 50 MCG/ACT nasal spray     fluticasone (FLOVENT HFA) 110 MCG/ACT inhaler     loratadine-pseudoePHEDrine (CLARITIN-D 24-HOUR)  MG 24 hr tablet     losartan (COZAAR) 25 MG tablet     Omega-3 Fatty Acids (FISH OIL-OMEGA-3 FATTY ACID) 1000 MG DR capsule     No current facility-administered medications for this visit.       Allergies   Allergen Reactions     Cats      Dogs      Seasonal Allergies Other (See Comments)     Birch trees, maple trees, aramis grass, orchard grass, mugwart, lamb's quarter, dust mites       Social History     Socioeconomic History     Marital  status:      Spouse name: Not on file     Number of children: Not on file     Years of education: Not on file     Highest education level: Not on file   Occupational History     Not on file   Tobacco Use     Smoking status: Former Smoker     Packs/day: 0.50     Years: 15.00     Pack years: 7.50     Types: Cigarettes     Start date:      Quit date:      Years since quittin.7     Smokeless tobacco: Never Used   Vaping Use     Vaping Use: Never used   Substance and Sexual Activity     Alcohol use: Yes     Comment: occasionally     Drug use: Never     Sexual activity: Not Currently   Other Topics Concern     Not on file   Social History Narrative     Not on file     Social Determinants of Health     Financial Resource Strain:      Difficulty of Paying Living Expenses:    Food Insecurity:      Worried About Running Out of Food in the Last Year:      Ran Out of Food in the Last Year:    Transportation Needs:      Lack of Transportation (Medical):      Lack of Transportation (Non-Medical):    Physical Activity:      Days of Exercise per Week:      Minutes of Exercise per Session:    Stress:      Feeling of Stress :    Social Connections:      Frequency of Communication with Friends and Family:      Frequency of Social Gatherings with Friends and Family:      Attends [employer] Services:      Active Member of Clubs or Organizations:      Attends Club or Organization Meetings:      Marital Status:    Intimate Partner Violence:      Fear of Current or Ex-Partner:      Emotionally Abused:      Physically Abused:      Sexually Abused:        Family History   Problem Relation Age of Onset     Heart Failure Mother          ROS: 10 point ROS neg other than the symptoms noted above in the HPI.    Vital Signs:   LMP  (LMP Unknown)       Examination:  Constitutional:  Alert, well nourished, NAD.  Memory: recent and remote memory   HEENT: Normocephalic, atraumatic.   Pulm:  Without shortness of breath   CV:  No  pitting edema of BLE.      Neurological:  Awake  Alert  Oriented x 3  Speech clear  Tongue midline    Motor exam:  Shoulder Abduction:  Right:  5/5   Left:  5/5  Biceps:                      Right:  5/5   Left:  5/5  Triceps:                     Right:  5/5   Left:  5/5  Wrist Extensors:       Right:  5/5   Left:  5/5  Wrist Flexors:           Right:  5/5   Left:  5/5  Intrinsics:                   Right:  5/5   Left:  5/5    Sensation normal to bilateral upper and lower extremities  Muscle tone to bilateral upper and lower extremities   Gait: Able to stand from a seated position. Normal non-antalgic, non-myelopathic gait.  Able to heel/toe walk without loss of balance    Cervical examination reveals good range of motion.  No tenderness to palpation of the cervical spine or paraspinous muscles bilaterally.    Imaging:   Cervical MRI 9/2/2021  IMPRESSION:    1. Solid bony fusion across the C6-C7 disc space with probable disc spacer at that level. No spinal canal or neural foraminal narrowing at the postoperative level.  2. Degenerative changes in the cervical spine, most pronounced at C4-C5 and C5-C6 as detailed above.  3. Mild right neural foraminal narrowing at C5-C6. No other significant spinal canal or neural foraminal narrowings.     Assessment/Plan:   Cervical radiculopathy. Plan for PT and DAYANA at this time. She will contact the clinic with new or worsening symptoms. She verbalized understanding and agreement.    Patient Instructions   -Cervical injection ordered. They will contact you to schedule.  -Physical therapy ordered. They will contact you to schedule.  -Please contact our clinic with questions or concerns at [phone].      Megan Long, [facility]  [address] [place] [id number]  Tel [phone]  Fax [phone]     Anemia

## 2024-02-25 ENCOUNTER — HEALTH MAINTENANCE LETTER (OUTPATIENT)
Age: 64
End: 2024-02-25

## 2024-04-23 ENCOUNTER — MYC MEDICAL ADVICE (OUTPATIENT)
Dept: FAMILY MEDICINE | Facility: OTHER | Age: 64
End: 2024-04-23
Payer: COMMERCIAL

## 2024-04-23 NOTE — CONFIDENTIAL NOTE
Patient Quality Outreach    Patient is due for the following:   Physical Preventive Adult Physical    Next Steps:   Schedule a Adult Preventative    Type of outreach:    Sent [employer] message.      Questions for provider review:    None           Gema Oquendo MA

## 2024-09-20 ENCOUNTER — PATIENT OUTREACH (OUTPATIENT)
Dept: CARE COORDINATION | Facility: CLINIC | Age: 64
End: 2024-09-20
Payer: COMMERCIAL

## 2024-09-22 ENCOUNTER — HEALTH MAINTENANCE LETTER (OUTPATIENT)
Age: 64
End: 2024-09-22

## 2024-10-18 ENCOUNTER — PATIENT OUTREACH (OUTPATIENT)
Dept: CARE COORDINATION | Facility: CLINIC | Age: 64
End: 2024-10-18
Payer: COMMERCIAL

## 2025-01-12 ENCOUNTER — HEALTH MAINTENANCE LETTER (OUTPATIENT)
Age: 65
End: 2025-01-12

## (undated) DEVICE — ENDO SNARE EXACTO COLD 9MM LOOP 2.4MMX230CM 00711115

## (undated) DEVICE — SOL WATER IRRIG 500ML BOTTLE 2F7113

## (undated) DEVICE — GOWN IMPERVIOUS 2XL BLUE

## (undated) DEVICE — SUCTION MANIFOLD NEPTUNE 2 SYS 1 PORT 702-025-000

## (undated) DEVICE — SPECIMEN CONTAINER 3OZ W/FORMALIN 59901

## (undated) DEVICE — ENDO TRAP POLYP E-TRAP 00711099

## (undated) DEVICE — TUBING SUCTION 12"X1/4" N612

## (undated) DEVICE — KIT ENDO TURNOVER/PROCEDURE CARRY-ON 101822

## (undated) RX ORDER — FENTANYL CITRATE 50 UG/ML
INJECTION, SOLUTION INTRAMUSCULAR; INTRAVENOUS
Status: DISPENSED
Start: 2021-05-25